# Patient Record
Sex: FEMALE | Race: WHITE | NOT HISPANIC OR LATINO | Employment: UNEMPLOYED | URBAN - METROPOLITAN AREA
[De-identification: names, ages, dates, MRNs, and addresses within clinical notes are randomized per-mention and may not be internally consistent; named-entity substitution may affect disease eponyms.]

---

## 2023-11-06 DIAGNOSIS — Z00.00 ROUTINE ADULT HEALTH MAINTENANCE: ICD-10-CM

## 2023-11-06 DIAGNOSIS — Z76.89 ENCOUNTER TO ESTABLISH CARE: Primary | ICD-10-CM

## 2023-11-07 ENCOUNTER — HOSPITAL ENCOUNTER (OUTPATIENT)
Dept: CARDIOLOGY | Facility: HOSPITAL | Age: 63
Discharge: HOME | End: 2023-11-07
Attending: INTERNAL MEDICINE
Payer: COMMERCIAL

## 2023-11-07 ENCOUNTER — OFFICE VISIT (OUTPATIENT)
Dept: CARDIOLOGY | Facility: CLINIC | Age: 63
End: 2023-11-07
Payer: COMMERCIAL

## 2023-11-07 VITALS
WEIGHT: 229.75 LBS | HEART RATE: 89 BPM | BODY MASS INDEX: 34.82 KG/M2 | OXYGEN SATURATION: 96 % | HEIGHT: 68 IN | SYSTOLIC BLOOD PRESSURE: 145 MMHG | DIASTOLIC BLOOD PRESSURE: 85 MMHG

## 2023-11-07 DIAGNOSIS — Z00.00 ROUTINE ADULT HEALTH MAINTENANCE: ICD-10-CM

## 2023-11-07 DIAGNOSIS — N85.01 ENDOMETRIAL HYPERPLASIA WITHOUT ATYPIA, COMPLEX: ICD-10-CM

## 2023-11-07 DIAGNOSIS — G47.30 SLEEP APNEA, UNSPECIFIED TYPE: ICD-10-CM

## 2023-11-07 DIAGNOSIS — Z01.810 PREOP CARDIOVASCULAR EXAM: ICD-10-CM

## 2023-11-07 DIAGNOSIS — N80.9 ENDOMETRIOSIS: ICD-10-CM

## 2023-11-07 DIAGNOSIS — R00.2 PALPITATIONS: Primary | ICD-10-CM

## 2023-11-07 DIAGNOSIS — N95.0 POSTMENOPAUSAL BLEEDING: ICD-10-CM

## 2023-11-07 DIAGNOSIS — Z76.89 ENCOUNTER TO ESTABLISH CARE: ICD-10-CM

## 2023-11-07 PROCEDURE — 1159F MED LIST DOCD IN RCRD: CPT | Mod: CPTII,S$GLB,, | Performed by: INTERNAL MEDICINE

## 2023-11-07 PROCEDURE — 1159F PR MEDICATION LIST DOCUMENTED IN MEDICAL RECORD: ICD-10-PCS | Mod: CPTII,S$GLB,, | Performed by: INTERNAL MEDICINE

## 2023-11-07 PROCEDURE — 3079F DIAST BP 80-89 MM HG: CPT | Mod: CPTII,S$GLB,, | Performed by: INTERNAL MEDICINE

## 2023-11-07 PROCEDURE — 3008F BODY MASS INDEX DOCD: CPT | Mod: CPTII,S$GLB,, | Performed by: INTERNAL MEDICINE

## 2023-11-07 PROCEDURE — 99999 PR PBB SHADOW E&M-EST. PATIENT-LVL IV: ICD-10-PCS | Mod: PBBFAC,,, | Performed by: INTERNAL MEDICINE

## 2023-11-07 PROCEDURE — 93005 ELECTROCARDIOGRAM TRACING: CPT

## 2023-11-07 PROCEDURE — 3077F PR MOST RECENT SYSTOLIC BLOOD PRESSURE >= 140 MM HG: ICD-10-PCS | Mod: CPTII,S$GLB,, | Performed by: INTERNAL MEDICINE

## 2023-11-07 PROCEDURE — 99204 PR OFFICE/OUTPT VISIT, NEW, LEVL IV, 45-59 MIN: ICD-10-PCS | Mod: S$GLB,,, | Performed by: INTERNAL MEDICINE

## 2023-11-07 PROCEDURE — 3079F PR MOST RECENT DIASTOLIC BLOOD PRESSURE 80-89 MM HG: ICD-10-PCS | Mod: CPTII,S$GLB,, | Performed by: INTERNAL MEDICINE

## 2023-11-07 PROCEDURE — 3077F SYST BP >= 140 MM HG: CPT | Mod: CPTII,S$GLB,, | Performed by: INTERNAL MEDICINE

## 2023-11-07 PROCEDURE — 99999 PR PBB SHADOW E&M-EST. PATIENT-LVL IV: CPT | Mod: PBBFAC,,, | Performed by: INTERNAL MEDICINE

## 2023-11-07 PROCEDURE — 93010 EKG 12-LEAD: ICD-10-PCS | Mod: ,,, | Performed by: INTERNAL MEDICINE

## 2023-11-07 PROCEDURE — 99204 OFFICE O/P NEW MOD 45 MIN: CPT | Mod: S$GLB,,, | Performed by: INTERNAL MEDICINE

## 2023-11-07 PROCEDURE — 3008F PR BODY MASS INDEX (BMI) DOCUMENTED: ICD-10-PCS | Mod: CPTII,S$GLB,, | Performed by: INTERNAL MEDICINE

## 2023-11-07 PROCEDURE — 93010 ELECTROCARDIOGRAM REPORT: CPT | Mod: ,,, | Performed by: INTERNAL MEDICINE

## 2023-11-07 NOTE — PROGRESS NOTES
Subjective:   Patient ID:  Dyan Saini is a 63 y.o. female who presents for cardiac consult of Palpitations (Concerns with palpitations. Pt states palpitations are only present in the middle of the night and causes her to wake up out of her sleep.) and Irregular Heart Beat      Referral by: Alicia Cheney Md  [address]  [address]  [place]  [place] [address]     Reason for consult: palpitations       HPI  The patient came in today for cardiac consult of Palpitations (Concerns with palpitations. Pt states palpitations are only present in the middle of the night and causes her to wake up out of her sleep.) and Irregular Heart Beat    23  Dyan Saini is a 63 y.o. female with obesity, post menopausal bleeding sec to endometriosis, endometrial hyperplasia presents for CV eval of palpitations.     Pt has been having ongoing vaginal bleeding - per OB/GYN - Plan RALH BSO towards the end of the year for recurrent hyperplasia  Per pt - I have been waking up with pounding heart or racing heart rate .Should this be addressed before my surgery? I have horrible varicose veins and I was wondering if having surgery would increase my risks for blood clots .      BP elevated today 150s/90s. HR 80s. BMI 34 - 229 lbs.   She has been having palpitations during the middle of the night.   She has upcoming hysterectomy next week.     ECG - NSR, poor RWP, low voltage     No cardiac monitor results found for the past 12 months         Past Medical History:   Diagnosis Date    Endometrial hyperplasia without atypia, complex 2016    Endometrial polyp 2019    Endometriosis        Past Surgical History:   Procedure Laterality Date     SECTION      COLONOSCOPY      normal    DILATION AND CURETTAGE OF UTERUS  2016    HYSTEROSCOPY WITH DILATION AND CURETTAGE OF UTERUS  2019    polyp    HYSTEROSCOPY WITH DILATION AND CURETTAGE OF UTERUS  07/10/2023    Hyperplasia without atypia within endometrial polyp  "   LAPAROSCOPY  1998    endometrioma    TUBAL LIGATION Bilateral 1998       Social History     Tobacco Use    Smoking status: Never    Smokeless tobacco: Never   Substance Use Topics    Alcohol use: No    Drug use: No       Family History   Problem Relation Age of Onset    Breast cancer Mother         60 and 70    Breast cancer Paternal Grandmother         50's       Patient's Medications   New Prescriptions    No medications on file   Previous Medications    ERGOCALCIFEROL (VITAMIN D2) 50,000 UNIT CAP    Take 50,000 Units by mouth every 7 days.    MEDROXYPROGESTERONE (PROVERA) 2.5 MG TABLET    Take 1 tablet (2.5 mg total) by mouth once daily.   Modified Medications    No medications on file   Discontinued Medications    No medications on file       Review of Systems   Constitutional: Negative.    HENT: Negative.     Eyes: Negative.    Respiratory: Negative.     Cardiovascular:  Positive for palpitations.   Gastrointestinal: Negative.    Genitourinary: Negative.    Musculoskeletal: Negative.    Skin: Negative.    Neurological: Negative.    Endo/Heme/Allergies: Negative.    Psychiatric/Behavioral: Negative.     All 12 systems otherwise negative.      Wt Readings from Last 3 Encounters:   11/07/23 104.2 kg (229 lb 11.5 oz)   07/20/23 103 kg (227 lb)   06/30/23 103.4 kg (228 lb)     Temp Readings from Last 3 Encounters:   07/10/13 97.8 °F (36.6 °C) (Oral)     BP Readings from Last 3 Encounters:   11/07/23 (!) 145/85   07/20/23 (!) 138/90   06/30/23 130/82     Pulse Readings from Last 3 Encounters:   11/07/23 89   07/10/13 96       BP (!) 145/85 (BP Location: Right arm, Patient Position: Sitting, BP Method: Large (Manual))   Pulse 89   Ht 5' 8" (1.727 m)   Wt 104.2 kg (229 lb 11.5 oz)   LMP 07/08/2013   SpO2 96%   BMI 34.93 kg/m²     Objective:   Physical Exam  Vitals and nursing note reviewed.   Constitutional:       General: She is not in acute distress.     Appearance: She is well-developed. She is not " diaphoretic.   HENT:      Head: Normocephalic and atraumatic.      Nose: Nose normal.   Eyes:      General: No scleral icterus.     Conjunctiva/sclera: Conjunctivae normal.   Neck:      Thyroid: No thyromegaly.      Vascular: No JVD.   Cardiovascular:      Rate and Rhythm: Normal rate and regular rhythm.      Heart sounds: S1 normal and S2 normal. No murmur heard.     No friction rub. No gallop. No S3 or S4 sounds.   Pulmonary:      Effort: Pulmonary effort is normal. No respiratory distress.      Breath sounds: Normal breath sounds. No stridor. No wheezing or rales.   Chest:      Chest wall: No tenderness.   Abdominal:      General: Bowel sounds are normal. There is no distension.      Palpations: Abdomen is soft. There is no mass.      Tenderness: There is no abdominal tenderness. There is no rebound.   Genitourinary:     Comments: Deferred  Musculoskeletal:         General: No tenderness or deformity. Normal range of motion.      Cervical back: Normal range of motion and neck supple.   Lymphadenopathy:      Cervical: No cervical adenopathy.   Skin:     General: Skin is warm and dry.      Coloration: Skin is not pale.      Findings: No erythema or rash.   Neurological:      Mental Status: She is alert and oriented to person, place, and time.      Motor: No abnormal muscle tone.      Coordination: Coordination normal.   Psychiatric:         Behavior: Behavior normal.         Thought Content: Thought content normal.         Judgment: Judgment normal.         Lab Results   Component Value Date     07/10/2013    K 3.9 07/10/2013     07/10/2013    CO2 22 (L) 07/10/2013    BUN 13 07/10/2013    CREATININE 0.7 07/10/2013     07/10/2013    MG 2.0 07/10/2013    AST 19 07/10/2013    ALT 22 07/10/2013    ALBUMIN 4.2 07/10/2013    PROT 7.7 07/10/2013    BILITOT 0.6 07/10/2013    WBC 14.43 (H) 07/10/2013    HGB 13.3 07/10/2013    HCT 41.3 07/10/2013    MCV 86.2 07/10/2013     07/10/2013    INR 1.0  07/10/2013         INR (no units)   Date Value   07/10/2013 1.0          Assessment:      1. Palpitations    2. Endometrial hyperplasia without atypia, complex    3. Endometriosis    4. Postmenopausal bleeding    5. Preop cardiovascular exam    6. Sleep apnea, unspecified type        Plan:     Palpitations, elevated BP   - order ECHO and Holter   - R/o GOMEZ     2. Endometrial hyperplasia, endometriosis  - with post menopausal bleeding  - f/u OB/gyn  - has upcoming hysterectomy     3. Obesity. BMI 34   - cont weight loss     4. Pre-OP CV evaluation prior to hysterectomy   Low periop risk of CV events for moderate risk procedure.  Good functional and exercise capacity.  No chest pain, active arrhythmia and CHF symptoms.  Ok to proceed to the scheduled surgery without further cardiac study.    Thank you for allowing me to participate in this patient's care. Please do not hesitate to contact me with any questions or concerns. Consult note has been forwarded to the referral physician.     Perdito Escalona MD, [facility]  Cardiovascular Disease  [facility], [place]  [phone] (P)

## 2023-11-08 ENCOUNTER — HOSPITAL ENCOUNTER (OUTPATIENT)
Dept: CARDIOLOGY | Facility: HOSPITAL | Age: 63
Discharge: HOME | End: 2023-11-08
Attending: INTERNAL MEDICINE
Payer: COMMERCIAL

## 2023-11-08 VITALS
SYSTOLIC BLOOD PRESSURE: 145 MMHG | DIASTOLIC BLOOD PRESSURE: 85 MMHG | BODY MASS INDEX: 34.71 KG/M2 | HEIGHT: 68 IN | WEIGHT: 229 LBS

## 2023-11-08 DIAGNOSIS — N95.0 POSTMENOPAUSAL BLEEDING: ICD-10-CM

## 2023-11-08 DIAGNOSIS — N85.01 ENDOMETRIAL HYPERPLASIA WITHOUT ATYPIA, COMPLEX: ICD-10-CM

## 2023-11-08 DIAGNOSIS — Z01.810 PREOP CARDIOVASCULAR EXAM: ICD-10-CM

## 2023-11-08 DIAGNOSIS — R00.2 PALPITATIONS: ICD-10-CM

## 2023-11-08 DIAGNOSIS — N80.9 ENDOMETRIOSIS: ICD-10-CM

## 2023-11-08 PROCEDURE — 93306 TTE W/DOPPLER COMPLETE: CPT | Mod: 26,,, | Performed by: INTERNAL MEDICINE

## 2023-11-08 PROCEDURE — 93227 HOLTER MONITOR - 48 HOUR (CUPID ONLY): ICD-10-PCS | Mod: ,,, | Performed by: INTERNAL MEDICINE

## 2023-11-08 PROCEDURE — 93306 TTE W/DOPPLER COMPLETE: CPT

## 2023-11-08 PROCEDURE — 93306 ECHO (CUPID ONLY): ICD-10-PCS | Mod: 26,,, | Performed by: INTERNAL MEDICINE

## 2023-11-08 PROCEDURE — 93227 XTRNL ECG REC<48 HR R&I: CPT | Mod: ,,, | Performed by: INTERNAL MEDICINE

## 2023-11-08 PROCEDURE — 93226 XTRNL ECG REC<48 HR SCAN A/R: CPT

## 2023-11-09 ENCOUNTER — PATIENT MESSAGE (OUTPATIENT)
Dept: CARDIOLOGY | Facility: CLINIC | Age: 63
End: 2023-11-09
Payer: COMMERCIAL

## 2023-11-09 LAB
AORTIC ROOT ANNULUS: 3.16 CM
ASCENDING AORTA: 3.37 CM
AV INDEX (PROSTH): 0.91
AV MEAN GRADIENT: 3 MMHG
AV PEAK GRADIENT: 6 MMHG
AV VALVE AREA BY VELOCITY RATIO: 2.46 CM²
AV VALVE AREA: 2.67 CM²
AV VELOCITY RATIO: 0.84
BSA FOR ECHO PROCEDURE: 2.23 M2
CV ECHO LV RWT: 0.52 CM
DOP CALC AO PEAK VEL: 1.25 M/S
DOP CALC AO VTI: 27.5 CM
DOP CALC LVOT AREA: 2.9 CM2
DOP CALC LVOT DIAMETER: 1.93 CM
DOP CALC LVOT PEAK VEL: 1.05 M/S
DOP CALC LVOT STROKE VOLUME: 73.39 CM3
DOP CALC RVOT PEAK VEL: 0.54 M/S
DOP CALC RVOT VTI: 11.6 CM
DOP CALCLVOT PEAK VEL VTI: 25.1 CM
E WAVE DECELERATION TIME: 216.57 MSEC
E/A RATIO: 0.76
E/E' RATIO: 6.21 M/S
ECHO LV POSTERIOR WALL: 1.21 CM (ref 0.6–1.1)
FRACTIONAL SHORTENING: 37 % (ref 28–44)
INTERVENTRICULAR SEPTUM: 1.44 CM (ref 0.6–1.1)
IVC DIAMETER: 1.64 CM
IVRT: 68.51 MSEC
LA MAJOR: 5.81 CM
LA MINOR: 5.6 CM
LA WIDTH: 3.7 CM
LEFT ATRIUM SIZE: 3.84 CM
LEFT ATRIUM VOLUME INDEX MOD: 23.3 ML/M2
LEFT ATRIUM VOLUME INDEX: 31.9 ML/M2
LEFT ATRIUM VOLUME MOD: 50.25 CM3
LEFT ATRIUM VOLUME: 68.87 CM3
LEFT INTERNAL DIMENSION IN SYSTOLE: 2.94 CM (ref 2.1–4)
LEFT VENTRICLE DIASTOLIC VOLUME INDEX: 46.31 ML/M2
LEFT VENTRICLE DIASTOLIC VOLUME: 100.03 ML
LEFT VENTRICLE MASS INDEX: 111 G/M2
LEFT VENTRICLE SYSTOLIC VOLUME INDEX: 15.5 ML/M2
LEFT VENTRICLE SYSTOLIC VOLUME: 33.42 ML
LEFT VENTRICULAR INTERNAL DIMENSION IN DIASTOLE: 4.65 CM (ref 3.5–6)
LEFT VENTRICULAR MASS: 240.61 G
LV LATERAL E/E' RATIO: 4.92 M/S
LV SEPTAL E/E' RATIO: 8.43 M/S
LVOT MG: 2.23 MMHG
LVOT MV: 0.69 CM/S
MV PEAK A VEL: 0.78 M/S
MV PEAK E VEL: 0.59 M/S
MV STENOSIS PRESSURE HALF TIME: 62.81 MS
MV VALVE AREA P 1/2 METHOD: 3.5 CM2
PISA TR MAX VEL: 3.09 M/S
PV MEAN GRADIENT: 1 MMHG
PV MV: 0.79 M/S
PV PEAK GRADIENT: 7 MMHG
PV PEAK VELOCITY: 1.31 M/S
RA MAJOR: 4.85 CM
RA PRESSURE ESTIMATED: 3 MMHG
RA WIDTH: 2.98 CM
RIGHT VENTRICULAR END-DIASTOLIC DIMENSION: 3.03 CM
RV TB RVSP: 6 MMHG
RV TISSUE DOPPLER FREE WALL SYSTOLIC VELOCITY 1 (APICAL 4 CHAMBER VIEW): 14.03 CM/S
SINUS: 2.92 CM
STJ: 2.61 CM
TDI LATERAL: 0.12 M/S
TDI SEPTAL: 0.07 M/S
TDI: 0.1 M/S
TR MAX PG: 38 MMHG
TRICUSPID ANNULAR PLANE SYSTOLIC EXCURSION: 2.17 CM
TV REST PULMONARY ARTERY PRESSURE: 41 MMHG
Z-SCORE OF LEFT VENTRICULAR DIMENSION IN END DIASTOLE: -4.2
Z-SCORE OF LEFT VENTRICULAR DIMENSION IN END SYSTOLE: -3.02

## 2023-11-14 ENCOUNTER — PATIENT MESSAGE (OUTPATIENT)
Dept: PULMONOLOGY | Facility: CLINIC | Age: 63
End: 2023-11-14
Payer: COMMERCIAL

## 2023-11-14 LAB
OHS CV EVENT MONITOR DAY: 0
OHS CV HOLTER LENGTH DECIMAL HOURS: 47.67
OHS CV HOLTER LENGTH HOURS: 47
OHS CV HOLTER LENGTH MINUTES: 40
OHS CV HOLTER SINUS AVERAGE HR: 84
OHS CV HOLTER SINUS MAX HR: 136
OHS CV HOLTER SINUS MIN HR: 58

## 2023-11-20 ENCOUNTER — TELEPHONE (OUTPATIENT)
Dept: SLEEP MEDICINE | Facility: CLINIC | Age: 63
End: 2023-11-20
Payer: COMMERCIAL

## 2023-11-20 ENCOUNTER — OFFICE VISIT (OUTPATIENT)
Dept: PULMONOLOGY | Facility: CLINIC | Age: 63
End: 2023-11-20
Payer: COMMERCIAL

## 2023-11-20 VITALS
HEART RATE: 85 BPM | BODY MASS INDEX: 34.4 KG/M2 | WEIGHT: 227 LBS | DIASTOLIC BLOOD PRESSURE: 83 MMHG | SYSTOLIC BLOOD PRESSURE: 127 MMHG | HEIGHT: 68 IN

## 2023-11-20 DIAGNOSIS — R00.2 PALPITATIONS WITH REGULAR CARDIAC RHYTHM: Primary | ICD-10-CM

## 2023-11-20 DIAGNOSIS — E66.9 CLASS 1 OBESITY WITHOUT SERIOUS COMORBIDITY WITH BODY MASS INDEX (BMI) OF 34.0 TO 34.9 IN ADULT, UNSPECIFIED OBESITY TYPE: ICD-10-CM

## 2023-11-20 DIAGNOSIS — G47.30 SLEEP-DISORDERED BREATHING: ICD-10-CM

## 2023-11-20 PROCEDURE — 1159F MED LIST DOCD IN RCRD: CPT | Mod: CPTII,95,, | Performed by: NURSE PRACTITIONER

## 2023-11-20 PROCEDURE — 1160F RVW MEDS BY RX/DR IN RCRD: CPT | Mod: CPTII,95,, | Performed by: NURSE PRACTITIONER

## 2023-11-20 PROCEDURE — 3079F DIAST BP 80-89 MM HG: CPT | Mod: CPTII,95,, | Performed by: NURSE PRACTITIONER

## 2023-11-20 PROCEDURE — 99203 PR OFFICE/OUTPT VISIT, NEW, LEVL III, 30-44 MIN: ICD-10-PCS | Mod: 95,,, | Performed by: NURSE PRACTITIONER

## 2023-11-20 PROCEDURE — 3074F SYST BP LT 130 MM HG: CPT | Mod: CPTII,95,, | Performed by: NURSE PRACTITIONER

## 2023-11-20 PROCEDURE — 99203 OFFICE O/P NEW LOW 30 MIN: CPT | Mod: 95,,, | Performed by: NURSE PRACTITIONER

## 2023-11-20 PROCEDURE — 3074F PR MOST RECENT SYSTOLIC BLOOD PRESSURE < 130 MM HG: ICD-10-PCS | Mod: CPTII,95,, | Performed by: NURSE PRACTITIONER

## 2023-11-20 PROCEDURE — 3008F BODY MASS INDEX DOCD: CPT | Mod: CPTII,95,, | Performed by: NURSE PRACTITIONER

## 2023-11-20 PROCEDURE — 1159F PR MEDICATION LIST DOCUMENTED IN MEDICAL RECORD: ICD-10-PCS | Mod: CPTII,95,, | Performed by: NURSE PRACTITIONER

## 2023-11-20 PROCEDURE — 1160F PR REVIEW ALL MEDS BY PRESCRIBER/CLIN PHARMACIST DOCUMENTED: ICD-10-PCS | Mod: CPTII,95,, | Performed by: NURSE PRACTITIONER

## 2023-11-20 PROCEDURE — 3008F PR BODY MASS INDEX (BMI) DOCUMENTED: ICD-10-PCS | Mod: CPTII,95,, | Performed by: NURSE PRACTITIONER

## 2023-11-20 PROCEDURE — 3079F PR MOST RECENT DIASTOLIC BLOOD PRESSURE 80-89 MM HG: ICD-10-PCS | Mod: CPTII,95,, | Performed by: NURSE PRACTITIONER

## 2023-11-20 NOTE — TELEPHONE ENCOUNTER
----- Message from Shay Lopez sent at 11/20/2023  1:46 PM CST -----  [facility]

## 2023-11-20 NOTE — PROGRESS NOTES
The patient location is: [place]  The chief complaint leading to consultation is: sleep apnea    Visit type: audiovisual    Face to Face time with patient:   32 minutes of total time spent on the encounter, which includes face to face time and non-face to face time preparing to see the patient (eg, review of tests), Obtaining and/or reviewing separately obtained history, Documenting clinical information in the electronic or other health record, Independently interpreting results (not separately reported) and communicating results to the patient/family/caregiver, or Care coordination (not separately reported).         Each patient to whom he or she provides medical services by telemedicine is:  (1) informed of the relationship between the physician and patient and the respective role of any other health care provider with respect to management of the patient; and (2) notified that he or she may decline to receive medical services by telemedicine and may withdraw from such care at any time.    Notes:     Subjective:      Patient ID: Dyan Saini is a 63 y.o. female.    Patient Active Problem List   Diagnosis    Endometrial hyperplasia without atypia, complex    Endometriosis    Postmenopausal bleeding       she has been referred by Pedrito Escalona MD for evaluation and management for   Chief Complaint   Patient presents with    Sleep Apnea       Chief Complaint: Sleep Apnea    HPI:  She presents telemedicine visit for a sleep evaluation sent by cardiologist related to awakening with palpitations at night.   Patient states she has awakening about 3 am after menopause since age 59.   Patient reports restful sleep.  She denies morning headache.   She denies day time napping  She 60 lbs weight gain in oast 10 years.  Cardiovascular risk factors: obesity, heart palp  Bed time is 0900  Wake time is 0430  Sleep onset is within  15 Minutes.  Sleep maintenance difficulties related to early morning awakening  Wake after sleep  onset occurs one time a night.  Nocturia occurs one time a night,   Sleep aids :  NO  Dry mouth :  NO  Sleep walking:  NO  Sleep talking :  NO  Sleep eating: NO  Vivid Dreams :  NO  Cataplexy :  NO    [place] Sleepiness Scale       2023    12:46 PM   EPWORTH SLEEPINESS SCALE   Sitting and reading 0   Watching TV 0   Sitting, inactive in a public place (e.g. a theatre or a meeting) 0   As a passenger in a car for an hour without a break 0   Lying down to rest in the afternoon when circumstances permit 0   Sitting and talking to someone 0   Sitting quietly after a lunch without alcohol 0   In a car, while stopped for a few minutes in traffic 0   Total score 0     Neck circumference is 15 inches.  Mallampati score 2    STOP - BANG Questionnaire:   1. Snoring : Do you snore loudly ?    NO  2. Tired : Do you often feel tired, fatigued, or sleepy during daytime?  NO  3. Observed: Has anyone observed you stop breathing during your sleep?    NO  4. Blood pressure : Do you have or are you being treated for high blood pressure?   NO  5. BMI :BMI more than 35 kg/m2?   NO  6. Age : Age over 50 yr old?    YES  7. Neck circumference: Neck circumference greater than 40 cm?   NO  8. Gender: Gender male?   NO    SCORE: 1    High risk of GOMEZ: Yes 5 - 8  Intermediate risk of GOMEZ: Yes 3 - 4  Low risk of GOMEZ: Yes 0 - 2    Previous Report Reviewed: lab reports and office notes     Past Medical History: The following portions of the patient's history were reviewed and updated as appropriate:   She  has a past surgical history that includes Colonoscopy (); Hysteroscopy with dilation and curettage of uterus (2019); Dilation and curettage of uterus (2016); Laparoscopy (); Tubal ligation (Bilateral, );  section; Hysteroscopy with dilation and curettage of uterus (07/10/2023); and robotic hysterectomy, with salpingo-oophorectomy (2023).  Her family history includes Breast cancer in her mother and  "paternal grandmother.  She  reports that she has never smoked. She has never used smokeless tobacco. She reports that she does not drink alcohol and does not use drugs.  She has a current medication list which includes the following prescription(s): ergocalciferol.  She is allergic to demerol [meperidine]..    Review of Systems   Constitutional:  Negative for fever, chills, weight loss, weight gain, activity change, appetite change, fatigue and night sweats.   HENT:  Negative for postnasal drip, rhinorrhea, sinus pressure, voice change and congestion.    Eyes:  Negative for redness and itching.   Respiratory:  Positive for snoring. Negative for cough, sputum production, chest tightness, shortness of breath, wheezing, orthopnea, asthma nighttime symptoms, dyspnea on extertion, use of rescue inhaler and somnolence.    Cardiovascular: Negative.  Negative for chest pain, palpitations and leg swelling.   Genitourinary:  Negative for difficulty urinating and hematuria.   Endocrine:  Negative for cold intolerance and heat intolerance.    Musculoskeletal:  Negative for arthralgias, gait problem, joint swelling and myalgias.   Skin: Negative.    Gastrointestinal:  Negative for nausea, vomiting, abdominal pain and acid reflux.   Neurological:  Negative for dizziness, weakness, light-headedness and headaches.   Hematological:  Negative for adenopathy. No excessive bruising.   All other systems reviewed and are negative.     Objective:   /83   Pulse 85   Ht 5' 8" (1.727 m)   Wt 103 kg (227 lb)   LMP 07/08/2013   BMI 34.52 kg/m²   Physical Exam  Vitals and nursing note reviewed.   Constitutional:       General: She is awake.      Appearance: Normal appearance. She is well-developed and well-groomed.   HENT:      Head: Normocephalic.   Eyes:      Conjunctiva/sclera: Conjunctivae normal.   Pulmonary:      Effort: Pulmonary effort is normal.   Neurological:      Mental Status: She is alert.   Psychiatric:         Mood " and Affect: Mood normal.         Behavior: Behavior normal. Behavior is cooperative.         Thought Content: Thought content normal.         Judgment: Judgment normal.         Personal Diagnostic Review  Review of labs, xray's, cardiology reports.     Assessment:     1. Palpitations with regular cardiac rhythm    2. Sleep-disordered breathing    3. Class 1 obesity without serious comorbidity with body mass index (BMI) of 34.0 to 34.9 in adult, unspecified obesity type      Orders Placed This Encounter   Procedures    Home Sleep Study     Standing Status:   Future     Standing Expiration Date:   11/20/2024     Scheduling Instructions:      2 night Home Sleep Study     Plan:   Discussed diagnosis, its evaluation, treatment and usual course. All questions answered.  Problem List Items Addressed This Visit    None  Visit Diagnoses       Palpitations with regular cardiac rhythm    -  Primary    Relevant Orders    Home Sleep Study    Sleep-disordered breathing        low risk jorge, no snoring, no daytime sleepiness. heart palpations w/nsr. 2 nights hsat. pt request home sleep testing    Relevant Orders    Home Sleep Study    Class 1 obesity without serious comorbidity with body mass index (BMI) of 34.0 to 34.9 in adult, unspecified obesity type        Relevant Orders    Home Sleep Study          I spent a total of 32 minutes on the day of the visit.  This includes face to face time and non-face to face time preparing to see the patient (eg, review of tests), obtaining and/or reviewing separately obtained history, documenting clinical information in the electronic or other health record, independently interpreting results and communicating results to the patient/family/caregiver, or care coordinator.    Follow up for if JORGE, begin PAP therapy nasal then fu IDL.    Thank you for the opportunity to participate in the care of this patient.

## 2023-12-11 ENCOUNTER — PATIENT MESSAGE (OUTPATIENT)
Dept: PULMONOLOGY | Facility: CLINIC | Age: 63
End: 2023-12-11

## 2023-12-11 ENCOUNTER — HOSPITAL ENCOUNTER (OUTPATIENT)
Dept: SLEEP MEDICINE | Facility: HOSPITAL | Age: 63
Discharge: HOME | End: 2023-12-11
Attending: NURSE PRACTITIONER
Payer: COMMERCIAL

## 2023-12-11 DIAGNOSIS — R00.2 PALPITATIONS WITH REGULAR CARDIAC RHYTHM: ICD-10-CM

## 2023-12-11 DIAGNOSIS — E66.9 CLASS 1 OBESITY WITHOUT SERIOUS COMORBIDITY WITH BODY MASS INDEX (BMI) OF 34.0 TO 34.9 IN ADULT, UNSPECIFIED OBESITY TYPE: ICD-10-CM

## 2023-12-11 DIAGNOSIS — G47.33 OSA (OBSTRUCTIVE SLEEP APNEA): Primary | ICD-10-CM

## 2023-12-11 DIAGNOSIS — G47.33 OBSTRUCTIVE SLEEP APNEA: Primary | ICD-10-CM

## 2023-12-11 DIAGNOSIS — G47.30 SLEEP-DISORDERED BREATHING: ICD-10-CM

## 2023-12-11 PROCEDURE — 95806 SLEEP STUDY UNATT&RESP EFFT: CPT | Mod: 26,,, | Performed by: INTERNAL MEDICINE

## 2023-12-11 PROCEDURE — 95806 SLEEP STUDY UNATT&RESP EFFT: CPT | Performed by: INTERNAL MEDICINE

## 2023-12-11 PROCEDURE — 95806 PR SLEEP STUDY, UNATTENDED, SIMUL RECORD HR/O2 SAT/RESP FLOW/RESP EFFT: ICD-10-PCS | Mod: 26,,, | Performed by: INTERNAL MEDICINE

## 2023-12-11 NOTE — Clinical Note
2 night study MODERATE OBSTRUCTIVE SLEEP APNEA with overall AHI 21.5/hr (128 events): night #1 Oxygen desaturation: 86%. SpO2 between 85% to 89% for 2 min. Patient snored 99% time above 50 . Heart rate range: 72 bpm - 111bpm REC's: Therapy with APAP at 6-20 cm WP using mask of choice with heated humidification is an option. Please refer to sleep disorders clinic Weight loss/management. with regular exercise per direction of physician. Avoid drowsy driving. Follow up in sleep clinic to maximize adherence and ensure resolution of symptoms

## 2023-12-11 NOTE — ASSESSMENT & PLAN NOTE
11/29/2023, 11/30/2023 Home Sleep Study 2 night study  MODERATE GOMEZ with overall AHI 21.5/hr.   12/11/2023 order Auto CPAP 5-20 cm   Nasal mask   HME: [facility]   Follow up 31-90 days from obtaining PAP therapy for IDL.

## 2023-12-11 NOTE — PROGRESS NOTES
Orders Placed This Encounter   Procedures    CPAP FOR HOME USE     11/29/2023, 11/30/2023 Home Sleep Study 2 night study MODERATE OBSTRUCTIVE SLEEP APNEA with overall AHI 21.5/hr (128 events): night #1. Oxygen desaturation: 86%. SpO2 between 85% to 89% for 2 min.  Patient snored 99% time above 50 .Heart rate range: 72 bpm - 111bpm     Order Specific Question:   Length of need (1-99 months):     Answer:   99     Order Specific Question:   Type ():     Answer:   Auto CPAP     Order Specific Question:   Auto CPAP pressure setting range (cmH20):     Answer:   5-20     Order Specific Question:   Fulfillment Priority:     Answer:   Level 4:  all others     Order Specific Question:   Humidification ():     Answer:   Heated     Order Specific Question:   Choose ONE mask type and its corresponding cushions and/or pillows:     Answer:    Nasal Mask, 1 per 90 days:  Nasal Cushions, (6 per 90 days):  Nasal Pillows, (6 per 90 days)     Order Specific Question:   Choose EITHER Heated or Non-Heated Tubjing     Answer:    Non-Heated Tubing, 1 per 90 days     Order Specific Question:   Number of Days Needed:     Answer:   99     Order Specific Question:   All other supplies as needed as listed below:     Answer:    Headgear, 1 per 180 days     Order Specific Question:   All other supplies as needed as listed below:     Answer:    Chin Strap, 1 per 180 days     Order Specific Question:   All other supplies as needed as listed below:     Answer:    Disposable Filter, 6 per 90 days     Order Specific Question:   All other supplies as needed as listed below:     Answer:    Non-Disposable Filter, 1 per 180 days     Order Specific Question:   All other supplies as needed as listed below:     Answer:    Humidifier Chamber, 1 per 180 days     Problem List Items Addressed This Visit       Obstructive sleep apnea - Primary     11/29/2023, 11/30/2023 Home Sleep Study 2 night  study  MODERATE GOMEZ with overall AHI 21.5/hr.   12/11/2023 order Auto CPAP 5-20 cm   Nasal mask   [facility]: [facility]   Follow up 31-90 days from obtaining PAP therapy for IDL.          Relevant Orders    CPAP FOR HOME USE     Follow up in about 10 weeks (around 2/19/2024) for CPAP compliance download after initial set up.

## 2023-12-11 NOTE — PROCEDURES
"2 night study  MODERATE OBSTRUCTIVE SLEEP APNEA with overall AHI 21.5/hr (128 events):  night #1  Oxygen desaturation: 86%. SpO2 between 85% to 89% for 2 min.  Patient snored 99% time above 50 .  Heart rate range: 72 bpm - 111bpm  REC's:  Therapy with APAP at 6-20 cm WP using mask of choice with heated humidification is an option.  Please refer to sleep disorders clinic  Weight loss/management. with regular exercise per direction of physician.  Avoid drowsy driving.  Follow up in sleep clinic to maximize adherence and ensure resolution of [place]  [address] [facility]  [place] [id number]/Alicia Dale, DO         The sleep study that you ordered is complete.  You have ordered sleep LAB services to perform the sleep study for Dyan Saini .      Please find Sleep Study result in  the "Media tab" of Chart Review menu.        You can look  for the report in the  Media by the document type "Sleep Study Documents". Alphabetizing  "Document type" column helps to find the SLEEP STUDY report  Faster.       As the ordering provider, you are responsible for reviewing the results and implementing a treatment plan with your patient.    If you need a Sleep Medicine provider to explain the sleep study findings and arrange treatment for the patient, please refer patient for consultation to our Sleep Clinic via [facility] with Ambulatory Consult Sleep.     To do that please place an order for an  "Ambulatory Consult Sleep" -  order , it will go to our clinic work queue for our staff  to contact the patient for an appointment.      For any questions, please contact our sleep lab  staff at [phone] to talk to clinical staff          Dustin Morgan MD    "

## 2023-12-28 ENCOUNTER — PATIENT MESSAGE (OUTPATIENT)
Dept: PULMONOLOGY | Facility: CLINIC | Age: 63
End: 2023-12-28
Payer: COMMERCIAL

## 2024-02-07 ENCOUNTER — OFFICE VISIT (OUTPATIENT)
Dept: CARDIOLOGY | Facility: CLINIC | Age: 64
End: 2024-02-07
Payer: COMMERCIAL

## 2024-02-07 VITALS
OXYGEN SATURATION: 100 % | HEART RATE: 98 BPM | DIASTOLIC BLOOD PRESSURE: 85 MMHG | HEIGHT: 68 IN | SYSTOLIC BLOOD PRESSURE: 148 MMHG | BODY MASS INDEX: 35.11 KG/M2 | WEIGHT: 231.69 LBS

## 2024-02-07 DIAGNOSIS — R03.0 ELEVATED BLOOD PRESSURE READING: ICD-10-CM

## 2024-02-07 DIAGNOSIS — N80.9 ENDOMETRIOSIS: ICD-10-CM

## 2024-02-07 DIAGNOSIS — N85.01 ENDOMETRIAL HYPERPLASIA WITHOUT ATYPIA, COMPLEX: ICD-10-CM

## 2024-02-07 DIAGNOSIS — G47.33 OSA (OBSTRUCTIVE SLEEP APNEA): ICD-10-CM

## 2024-02-07 DIAGNOSIS — R00.2 PALPITATIONS: ICD-10-CM

## 2024-02-07 DIAGNOSIS — I10 PRIMARY HYPERTENSION: Primary | ICD-10-CM

## 2024-02-07 PROCEDURE — 1160F RVW MEDS BY RX/DR IN RCRD: CPT | Mod: CPTII,S$GLB,, | Performed by: INTERNAL MEDICINE

## 2024-02-07 PROCEDURE — 3008F BODY MASS INDEX DOCD: CPT | Mod: CPTII,S$GLB,, | Performed by: INTERNAL MEDICINE

## 2024-02-07 PROCEDURE — 3077F SYST BP >= 140 MM HG: CPT | Mod: CPTII,S$GLB,, | Performed by: INTERNAL MEDICINE

## 2024-02-07 PROCEDURE — 3079F DIAST BP 80-89 MM HG: CPT | Mod: CPTII,S$GLB,, | Performed by: INTERNAL MEDICINE

## 2024-02-07 PROCEDURE — 99999 PR PBB SHADOW E&M-EST. PATIENT-LVL III: CPT | Mod: PBBFAC,,, | Performed by: INTERNAL MEDICINE

## 2024-02-07 PROCEDURE — 99214 OFFICE O/P EST MOD 30 MIN: CPT | Mod: S$GLB,,, | Performed by: INTERNAL MEDICINE

## 2024-02-07 PROCEDURE — 1159F MED LIST DOCD IN RCRD: CPT | Mod: CPTII,S$GLB,, | Performed by: INTERNAL MEDICINE

## 2024-02-07 RX ORDER — CARVEDILOL 6.25 MG/1
6.25 TABLET ORAL 2 TIMES DAILY WITH MEALS
Qty: 60 TABLET | Refills: 3 | Status: SHIPPED | OUTPATIENT
Start: 2024-02-07 | End: 2024-03-19 | Stop reason: SDUPTHER

## 2024-02-07 NOTE — PROGRESS NOTES
Subjective:   Patient ID:  Dyan Saini is a 63 y.o. female who presents for cardiac consult of Follow-up      Referral by: No referring provider defined for this encounter.     Reason for consult: palpitations       HPI  The patient came in today for cardiac consult of Follow-up      Dyan Saini is a 63 y.o. female with HTN, obesity, GOMEZ, post menopausal bleeding sec to endometriosis, endometrial hyperplasia s/p hysterectomy presents for follow up CV eval.     11/7/23  Pt has been having ongoing vaginal bleeding - per OB/GYN - Plan RALH BSO towards the end of the year for recurrent hyperplasia  Per pt - I have been waking up with pounding heart or racing heart rate .Should this be addressed before my surgery? I have horrible varicose veins and I was wondering if having surgery would increase my risks for blood clots .      BP elevated today 150s/90s. HR 80s. BMI 34 - 229 lbs.   She has been having palpitations during the middle of the night.   She has upcoming hysterectomy next week.   ECG - NSR, poor RWP, low voltage     2/7/24  BP elevated 150/90. HR 90s.   BMI 35 - 231 lbs.   ECHO 11/2023 with normal bi V function, PASP 41 mmHg.   Holter neg 11/2023    She still has trouble sleeping at night, has palpitations at night.     Procedure Note    2 night study  MODERATE OBSTRUCTIVE SLEEP APNEA with overall AHI 21.5/hr (128 events):  night #1  Oxygen desaturation: 86%. SpO2 between 85% to 89% for 2 min.  Patient snored 99% time above 50 .  Heart rate range: 72 bpm - 111bpm  REC's:  Therapy with APAP at 6-20 cm WP using mask of choice with heated humidification is an option.       Holter monitor - 48 hour    Result Date: 11/14/2023    The predominant rhythm is sinus.        Results for orders placed during the hospital encounter of 11/08/23    Echo    Interpretation Summary    Left Ventricle: The left ventricle is normal in size. Normal wall thickness. There is concentric hypertrophy. Normal wall motion. There  is normal systolic function with a visually estimated ejection fraction of 60 - 65%. There is normal diastolic function.    Right Ventricle: Normal right ventricular cavity size. Wall thickness is normal. Right ventricle wall motion  is normal. Systolic function is normal.    Tricuspid Valve: There is mild regurgitation.    Pulmonary Artery: The estimated pulmonary artery systolic pressure is 41 mmHg.    IVC/SVC: Normal venous pressure at 3 mmHg.        Past Medical History:   Diagnosis Date    Endometrial hyperplasia without atypia, complex 2016    Endometrial polyp 2019    Endometriosis        Past Surgical History:   Procedure Laterality Date     SECTION      COLONOSCOPY      normal    DILATION AND CURETTAGE OF UTERUS  2016    HYSTEROSCOPY WITH DILATION AND CURETTAGE OF UTERUS  2019    polyp    HYSTEROSCOPY WITH DILATION AND CURETTAGE OF UTERUS  07/10/2023    Hyperplasia without atypia within endometrial polyp    LAPAROSCOPY      endometrioma    ROBOTIC HYSTERECTOMY, WITH SALPINGO-OOPHORECTOMY  2023    Endometrial hyperplasia    TUBAL LIGATION Bilateral        Social History     Tobacco Use    Smoking status: Never    Smokeless tobacco: Never   Substance Use Topics    Alcohol use: No    Drug use: No       Family History   Problem Relation Age of Onset    Breast cancer Mother         60 and 70    Breast cancer Paternal Grandmother         50's       Patient's Medications   New Prescriptions    CARVEDILOL (COREG) 6.25 MG TABLET    Take 1 tablet (6.25 mg total) by mouth 2 (two) times daily with meals.   Previous Medications    No medications on file   Modified Medications    No medications on file   Discontinued Medications    No medications on file       Review of Systems   Constitutional: Negative.    HENT: Negative.     Eyes: Negative.    Respiratory: Negative.     Cardiovascular:  Positive for palpitations.   Gastrointestinal: Negative.    Genitourinary: Negative.   "  Musculoskeletal: Negative.    Skin: Negative.    Neurological: Negative.    Endo/Heme/Allergies: Negative.    Psychiatric/Behavioral: Negative.     All 12 systems otherwise negative.      Wt Readings from Last 3 Encounters:   02/07/24 105.1 kg (231 lb 11.3 oz)   12/29/23 102.5 kg (226 lb)   12/08/23 101.6 kg (224 lb)     Temp Readings from Last 3 Encounters:   11/08/23 98.8 °F (37.1 °C)   07/10/13 97.8 °F (36.6 °C) (Oral)     BP Readings from Last 3 Encounters:   02/07/24 (!) 148/85   12/29/23 130/82   12/08/23 (!) 140/80     Pulse Readings from Last 3 Encounters:   02/07/24 98   11/20/23 85   11/08/23 (!) 114       BP (!) 148/85 (BP Location: Right arm, Patient Position: Sitting, BP Method: Large (Manual))   Pulse 98   Ht 5' 8" (1.727 m)   Wt 105.1 kg (231 lb 11.3 oz)   LMP 07/08/2013   SpO2 100%   BMI 35.23 kg/m²     Objective:   Physical Exam  Vitals and nursing note reviewed.   Constitutional:       General: She is not in acute distress.     Appearance: She is well-developed. She is not diaphoretic.   HENT:      Head: Normocephalic and atraumatic.      Nose: Nose normal.   Eyes:      General: No scleral icterus.     Conjunctiva/sclera: Conjunctivae normal.   Neck:      Thyroid: No thyromegaly.      Vascular: No JVD.   Cardiovascular:      Rate and Rhythm: Normal rate and regular rhythm.      Heart sounds: S1 normal and S2 normal. No murmur heard.     No friction rub. No gallop. No S3 or S4 sounds.   Pulmonary:      Effort: Pulmonary effort is normal. No respiratory distress.      Breath sounds: Normal breath sounds. No stridor. No wheezing or rales.   Chest:      Chest wall: No tenderness.   Abdominal:      General: Bowel sounds are normal. There is no distension.      Palpations: Abdomen is soft. There is no mass.      Tenderness: There is no abdominal tenderness. There is no rebound.   Genitourinary:     Comments: Deferred  Musculoskeletal:         General: No tenderness or deformity. Normal range of " motion.      Cervical back: Normal range of motion and neck supple.   Lymphadenopathy:      Cervical: No cervical adenopathy.   Skin:     General: Skin is warm and dry.      Coloration: Skin is not pale.      Findings: No erythema or rash.   Neurological:      Mental Status: She is alert and oriented to person, place, and time.      Motor: No abnormal muscle tone.      Coordination: Coordination normal.   Psychiatric:         Behavior: Behavior normal.         Thought Content: Thought content normal.         Judgment: Judgment normal.         Lab Results   Component Value Date     07/10/2013    K 3.9 07/10/2013     07/10/2013    CO2 22 (L) 07/10/2013    BUN 13 07/10/2013    CREATININE 0.7 07/10/2013     07/10/2013    MG 2.0 07/10/2013    AST 19 07/10/2013    ALT 22 07/10/2013    ALBUMIN 4.2 07/10/2013    PROT 7.7 07/10/2013    BILITOT 0.6 07/10/2013    WBC 14.43 (H) 07/10/2013    HGB 13.3 07/10/2013    HCT 41.3 07/10/2013    MCV 86.2 07/10/2013     07/10/2013    INR 1.0 07/10/2013         INR (no units)   Date Value   07/10/2013 1.0          Assessment:      1. Primary hypertension    2. Palpitations    3. Endometrial hyperplasia without atypia, complex    4. Endometriosis    5. GOMEZ (obstructive sleep apnea)    6. Elevated blood pressure reading          Plan:     Palpitations, elevated BP   - ECHO 11/2023 with normal bi V function, PASP 41 mmHg.   - Holter neg 11/2023  - start Coreg 6.25 mg BID    2. Endometrial hyperplasia, endometriosis s/p hysterectomy   - with post menopausal bleeding  - f/u OB/gyn    3. Obesity. BMI 34  --> BMI 35 -  231 lbs  - cont weight loss     4. GOMEZ  - cont CPAP    Thank you for allowing me to participate in this patient's care. Please do not hesitate to contact me with any questions or concerns. Consult note has been forwarded to the referral physician.     Pedrito Escalona MD, [facility]  Cardiovascular Disease  [facility], [place]  [phone] (P)

## 2024-03-14 ENCOUNTER — PATIENT MESSAGE (OUTPATIENT)
Dept: PULMONOLOGY | Facility: CLINIC | Age: 64
End: 2024-03-14
Payer: COMMERCIAL

## 2024-03-19 ENCOUNTER — PATIENT MESSAGE (OUTPATIENT)
Dept: CARDIOLOGY | Facility: CLINIC | Age: 64
End: 2024-03-19
Payer: COMMERCIAL

## 2024-03-19 ENCOUNTER — OFFICE VISIT (OUTPATIENT)
Dept: PULMONOLOGY | Facility: CLINIC | Age: 64
End: 2024-03-19
Payer: COMMERCIAL

## 2024-03-19 VITALS
SYSTOLIC BLOOD PRESSURE: 138 MMHG | HEART RATE: 92 BPM | HEIGHT: 68 IN | WEIGHT: 227 LBS | BODY MASS INDEX: 34.4 KG/M2 | DIASTOLIC BLOOD PRESSURE: 89 MMHG

## 2024-03-19 DIAGNOSIS — R00.2 PALPITATIONS WITH REGULAR CARDIAC RHYTHM: Primary | ICD-10-CM

## 2024-03-19 DIAGNOSIS — G47.33 OSA ON CPAP: ICD-10-CM

## 2024-03-19 PROCEDURE — 1159F MED LIST DOCD IN RCRD: CPT | Mod: CPTII,95,, | Performed by: NURSE PRACTITIONER

## 2024-03-19 PROCEDURE — 1160F RVW MEDS BY RX/DR IN RCRD: CPT | Mod: CPTII,95,, | Performed by: NURSE PRACTITIONER

## 2024-03-19 PROCEDURE — 3079F DIAST BP 80-89 MM HG: CPT | Mod: CPTII,95,, | Performed by: NURSE PRACTITIONER

## 2024-03-19 PROCEDURE — 99213 OFFICE O/P EST LOW 20 MIN: CPT | Mod: 95,,, | Performed by: NURSE PRACTITIONER

## 2024-03-19 PROCEDURE — 3075F SYST BP GE 130 - 139MM HG: CPT | Mod: CPTII,95,, | Performed by: NURSE PRACTITIONER

## 2024-03-19 PROCEDURE — 3008F BODY MASS INDEX DOCD: CPT | Mod: CPTII,95,, | Performed by: NURSE PRACTITIONER

## 2024-03-19 RX ORDER — CARVEDILOL 12.5 MG/1
12.5 TABLET ORAL 2 TIMES DAILY WITH MEALS
Qty: 60 TABLET | Refills: 2 | Status: SHIPPED | OUTPATIENT
Start: 2024-03-19 | End: 2024-05-21 | Stop reason: SDUPTHER

## 2024-03-19 NOTE — ASSESSMENT & PLAN NOTE
11/29/2023, 11/30/2023 Home Sleep Study 2 night study  MODERATE GOMEZ with overall AHI 21.5/hr.   12/11/2023 order Auto CPAP 5-20 cm   01/24/2024 Setup [facility]  On Auto CPAP 4-20 cm with optimal control AHI 0.7  Nasal wisp mask   HME: [facility]   Follow up 6 mo, patient requested telemed visit.

## 2024-03-19 NOTE — PROGRESS NOTES
The patient location is: home  The chief complaint leading to consultation is: Sleep apnea    Visit type: audiovisual    Face to Face time with patient:   21 minutes of total time spent on the encounter, which includes face to face time and non-face to face time preparing to see the patient (eg, review of tests), Obtaining and/or reviewing separately obtained history, Documenting clinical information in the electronic or other health record, Independently interpreting results (not separately reported) and communicating results to the patient/family/caregiver, or Care coordination (not separately reported).     Each patient to whom he or she provides medical services by telemedicine is:  (1) informed of the relationship between the physician and patient and the respective role of any other health care provider with respect to management of the patient; and (2) notified that he or she may decline to receive medical services by telemedicine and may withdraw from such care at any time.    Subjective:      Patient ID: Dyan Saini is a 63 y.o. female.    Chief Complaint: Sleep Apnea    HPI: Dyan Saini engaged in telemedicine visit follow up for GOMEZ with initial CPAP complaince assessment.  She is on Auto CPAP of 4-20 cmH2O pressure which is optimally controlling sleep apnea with apneic index (AHI) 0.7 events an hour.   She is compliant with CPAP use. Complaince download today reveals 87% of days with greater than 4 hours of device use.   Patient reports benefit from CPAP use and denies snoring or excessive daytime sleepiness.  Patient reports no complaints. Nasal wisp mask is tolerated.     01/24/2024 Setup [facility]    11/29/2023, 11/30/2023 Home Sleep Study 2 night studyMODERATE GOMEZ  AHI 21.5/hr.         Ewing Sleepiness Scale       3/18/2024    10:31 AM 11/20/2023    12:46 PM   EPWORTH SLEEPINESS SCALE   Sitting and reading 0 0   Watching TV 0 0   Sitting, inactive in a public place (e.g. a theatre or a  meeting) 0 0   As a passenger in a car for an hour without a break 0 0   Lying down to rest in the afternoon when circumstances permit 1 0   Sitting and talking to someone 0 0   Sitting quietly after a lunch without alcohol 0 0   In a car, while stopped for a few minutes in traffic 0 0   Total score 1    1 0       CPAP compliance    Previous Report Reviewed: lab reports and office notes     Past Medical History: The following portions of the patient's history were reviewed and updated as appropriate:   She  has a past surgical history that includes Colonoscopy (); Hysteroscopy with dilation and curettage of uterus (2019); Dilation and curettage of uterus (2016); Laparoscopy (); Tubal ligation (Bilateral, );  section; Hysteroscopy with dilation and curettage of uterus (07/10/2023); and robotic hysterectomy, with salpingo-oophorectomy (2023).  Her family history includes Breast cancer in her mother and paternal grandmother.  She  reports that she has never smoked. She has never used smokeless tobacco. She reports that she does not drink alcohol and does not use drugs.  She has a current medication list which includes the following prescription(s): carvedilol.  She is allergic to demerol [meperidine]..    The following portions of the patient's history were reviewed and updated as appropriate: allergies, current medications, past family history, past medical history, past social history, past surgical history and problem list.    Review of Systems   Constitutional:  Negative for fever, chills, weight loss, weight gain, activity change, appetite change, fatigue and night sweats.   HENT:  Negative for postnasal drip, rhinorrhea, sinus pressure, voice change and congestion.    Eyes:  Negative for redness and itching.   Respiratory:  Negative for snoring, cough, sputum production, chest tightness, shortness of breath, wheezing, orthopnea, asthma nighttime symptoms, dyspnea on extertion,  "use of rescue inhaler and somnolence.    Cardiovascular: Negative.  Negative for chest pain, palpitations and leg swelling.   Genitourinary:  Negative for difficulty urinating and hematuria.   Endocrine:  Negative for cold intolerance and heat intolerance.    Musculoskeletal:  Negative for arthralgias, gait problem, joint swelling and myalgias.   Skin: Negative.    Gastrointestinal:  Negative for nausea, vomiting, abdominal pain and acid reflux.   Neurological:  Negative for dizziness, weakness, light-headedness and headaches.   Hematological:  Negative for adenopathy. No excessive bruising.   All other systems reviewed and are negative.     Objective:   /89 (BP Location: Left arm, Patient Position: Sitting)   Pulse 92   Ht 5' 8" (1.727 m)   Wt 103 kg (227 lb)   LMP 07/08/2013   BMI 34.52 kg/m²   Physical Exam  Vitals and nursing note reviewed.   Constitutional:       General: She is awake.      Appearance: Normal appearance. She is well-developed and well-groomed.   HENT:      Head: Normocephalic.   Eyes:      Conjunctiva/sclera: Conjunctivae normal.   Pulmonary:      Effort: Pulmonary effort is normal.   Neurological:      Mental Status: She is alert.   Psychiatric:         Mood and Affect: Mood normal.         Behavior: Behavior normal. Behavior is cooperative.         Thought Content: Thought content normal.         Judgment: Judgment normal.       Personal Diagnostic Review  CPAP download    Assessment:     1. Palpitations with regular cardiac rhythm    2. GOMEZ on CPAP        No orders of the defined types were placed in this encounter.    Plan:     Problem List Items Addressed This Visit       Palpitations with regular cardiac rhythm - Primary     Continued adherence to CPAP.  Continue follow-up with Cardiology         GOMEZ on CPAP     11/29/2023, 11/30/2023 Home Sleep Study 2 night study  MODERATE GOMEZ with overall AHI 21.5/hr.   12/11/2023 order Auto CPAP 5-20 cm   01/24/2024 Setup [facility]  On " Auto CPAP 4-20 cm with optimal control AHI 0.7  Nasal wisp mask   HME: [facility]   Follow up 6 mo, patient requested telemed visit.               (DME) - [facility]  Reviewed therapeutic goals for positive airway pressure therapy Auto CPAP  Ideal is usage 100% of nights for 6 - 8 hours per night. Minimum usage is 70% of night for at least 4 hours per night used.     I spent a total of 21 minutes on the day of the visit.  This includes face to face time and non-face to face time preparing to see the patient (eg, review of tests), obtaining and/or reviewing separately obtained history, documenting clinical information in the electronic or other health record, independently interpreting results and communicating results to the patient/family/caregiver, or care coordinator.    Follow up in about 6 months (around 9/19/2024) for CPAP 6 mo compliance download telemedicine visit .

## 2024-04-03 ENCOUNTER — PATIENT MESSAGE (OUTPATIENT)
Dept: PULMONOLOGY | Facility: CLINIC | Age: 64
End: 2024-04-03
Payer: COMMERCIAL

## 2024-05-09 DIAGNOSIS — R00.2 PALPITATIONS: Primary | ICD-10-CM

## 2024-05-09 DIAGNOSIS — I10 PRIMARY HYPERTENSION: ICD-10-CM

## 2024-05-21 ENCOUNTER — PATIENT MESSAGE (OUTPATIENT)
Dept: CARDIOLOGY | Facility: CLINIC | Age: 64
End: 2024-05-21

## 2024-05-21 ENCOUNTER — OFFICE VISIT (OUTPATIENT)
Dept: CARDIOLOGY | Facility: CLINIC | Age: 64
End: 2024-05-21
Payer: COMMERCIAL

## 2024-05-21 ENCOUNTER — HOSPITAL ENCOUNTER (OUTPATIENT)
Dept: CARDIOLOGY | Facility: HOSPITAL | Age: 64
Discharge: HOME | End: 2024-05-21
Attending: INTERNAL MEDICINE
Payer: COMMERCIAL

## 2024-05-21 VITALS
OXYGEN SATURATION: 97 % | WEIGHT: 237 LBS | HEIGHT: 68 IN | SYSTOLIC BLOOD PRESSURE: 135 MMHG | HEART RATE: 77 BPM | BODY MASS INDEX: 35.92 KG/M2 | DIASTOLIC BLOOD PRESSURE: 82 MMHG

## 2024-05-21 DIAGNOSIS — G47.33 OSA ON CPAP: ICD-10-CM

## 2024-05-21 DIAGNOSIS — I10 PRIMARY HYPERTENSION: ICD-10-CM

## 2024-05-21 DIAGNOSIS — N85.01 ENDOMETRIAL HYPERPLASIA WITHOUT ATYPIA, COMPLEX: ICD-10-CM

## 2024-05-21 DIAGNOSIS — G47.33 OSA (OBSTRUCTIVE SLEEP APNEA): ICD-10-CM

## 2024-05-21 DIAGNOSIS — R00.2 PALPITATIONS: ICD-10-CM

## 2024-05-21 DIAGNOSIS — N80.9 ENDOMETRIOSIS: ICD-10-CM

## 2024-05-21 DIAGNOSIS — R00.2 PALPITATIONS: Primary | ICD-10-CM

## 2024-05-21 DIAGNOSIS — R03.0 ELEVATED BLOOD PRESSURE READING: ICD-10-CM

## 2024-05-21 LAB
OHS QRS DURATION: 82 MS
OHS QTC CALCULATION: 424 MS

## 2024-05-21 PROCEDURE — 1160F RVW MEDS BY RX/DR IN RCRD: CPT | Mod: CPTII,S$GLB,, | Performed by: INTERNAL MEDICINE

## 2024-05-21 PROCEDURE — G2211 COMPLEX E/M VISIT ADD ON: HCPCS | Mod: S$GLB,,, | Performed by: INTERNAL MEDICINE

## 2024-05-21 PROCEDURE — 99999 PR PBB SHADOW E&M-EST. PATIENT-LVL III: CPT | Mod: PBBFAC,,, | Performed by: INTERNAL MEDICINE

## 2024-05-21 PROCEDURE — 99214 OFFICE O/P EST MOD 30 MIN: CPT | Mod: S$GLB,,, | Performed by: INTERNAL MEDICINE

## 2024-05-21 PROCEDURE — 93010 ELECTROCARDIOGRAM REPORT: CPT | Mod: ,,, | Performed by: INTERNAL MEDICINE

## 2024-05-21 PROCEDURE — 93005 ELECTROCARDIOGRAM TRACING: CPT

## 2024-05-21 PROCEDURE — 1159F MED LIST DOCD IN RCRD: CPT | Mod: CPTII,S$GLB,, | Performed by: INTERNAL MEDICINE

## 2024-05-21 PROCEDURE — 3008F BODY MASS INDEX DOCD: CPT | Mod: CPTII,S$GLB,, | Performed by: INTERNAL MEDICINE

## 2024-05-21 PROCEDURE — 3075F SYST BP GE 130 - 139MM HG: CPT | Mod: CPTII,S$GLB,, | Performed by: INTERNAL MEDICINE

## 2024-05-21 PROCEDURE — 3079F DIAST BP 80-89 MM HG: CPT | Mod: CPTII,S$GLB,, | Performed by: INTERNAL MEDICINE

## 2024-05-21 RX ORDER — PHENAZOPYRIDINE HYDROCHLORIDE 200 MG/1
200 TABLET, FILM COATED ORAL 3 TIMES DAILY PRN
COMMUNITY
Start: 2024-05-20 | End: 2024-05-23

## 2024-05-21 RX ORDER — NITROFURANTOIN 25; 75 MG/1; MG/1
100 CAPSULE ORAL
COMMUNITY
Start: 2024-05-20 | End: 2024-05-25

## 2024-05-21 RX ORDER — CARVEDILOL 12.5 MG/1
12.5 TABLET ORAL 2 TIMES DAILY WITH MEALS
Qty: 180 TABLET | Refills: 1 | Status: SHIPPED | OUTPATIENT
Start: 2024-05-21 | End: 2025-05-21

## 2024-05-21 NOTE — PROGRESS NOTES
Subjective:   Patient ID:  Dyan Saini is a 63 y.o. female who presents for cardiac consult of No chief complaint on file.      Referral by: No referring provider defined for this encounter.     Reason for consult: palpitations       HPI  The patient came in today for cardiac consult of No chief complaint on file.      Dyan Saini is a 63 y.o. female with HTN, obesity, GOMEZ, post menopausal bleeding sec to endometriosis, endometrial hyperplasia s/p hysterectomy presents for follow up CV eval.       2/7/24  BP elevated 150/90. HR 90s.   BMI 35 - 231 lbs.   ECHO 11/2023 with normal bi V function, PASP 41 mmHg.   Holter neg 11/2023  She still has trouble sleeping at night, has palpitations at night.       5/21/24  From pt - I just had a virtual visit with Pulmonologist Nurse Practitioner.I wanted to let you know my blood pressure is 138/89 My Sleep apnea is being controlled by the machine very well.Are you ok with my Blood Pressure!     I increase Coreg to 12.5 mg BID  BP and HR stable today. BMI 36 - 236 lbs   She is able to tolerate CPAP for the most part.       Procedure Note    2 night study  MODERATE OBSTRUCTIVE SLEEP APNEA with overall AHI 21.5/hr (128 events):  night #1  Oxygen desaturation: 86%. SpO2 between 85% to 89% for 2 min.  Patient snored 99% time above 50 .  Heart rate range: 72 bpm - 111bpm  REC's:  Therapy with APAP at 6-20 cm WP using mask of choice with heated humidification is an option.       Holter monitor - 48 hour    Result Date: 11/14/2023    The predominant rhythm is sinus.        Results for orders placed during the hospital encounter of 11/08/23    Echo    Interpretation Summary    Left Ventricle: The left ventricle is normal in size. Normal wall thickness. There is concentric hypertrophy. Normal wall motion. There is normal systolic function with a visually estimated ejection fraction of 60 - 65%. There is normal diastolic function.    Right Ventricle: Normal right ventricular  cavity size. Wall thickness is normal. Right ventricle wall motion  is normal. Systolic function is normal.    Tricuspid Valve: There is mild regurgitation.    Pulmonary Artery: The estimated pulmonary artery systolic pressure is 41 mmHg.    IVC/SVC: Normal venous pressure at 3 mmHg.        Past Medical History:   Diagnosis Date    Endometrial hyperplasia without atypia, complex 2016    Endometrial polyp 2019    Endometriosis        Past Surgical History:   Procedure Laterality Date     SECTION      COLONOSCOPY      normal    DILATION AND CURETTAGE OF UTERUS  2016    HYSTEROSCOPY WITH DILATION AND CURETTAGE OF UTERUS  2019    polyp    HYSTEROSCOPY WITH DILATION AND CURETTAGE OF UTERUS  07/10/2023    Hyperplasia without atypia within endometrial polyp    LAPAROSCOPY      endometrioma    ROBOTIC HYSTERECTOMY, WITH SALPINGO-OOPHORECTOMY  2023    Endometrial hyperplasia    TUBAL LIGATION Bilateral        Social History     Tobacco Use    Smoking status: Never    Smokeless tobacco: Never   Substance Use Topics    Alcohol use: No    Drug use: No       Family History   Problem Relation Name Age of Onset    Breast cancer Mother          60 and 70    Breast cancer Paternal Grandmother          50's       Patient's Medications   New Prescriptions    No medications on file   Previous Medications    CARVEDILOL (COREG) 12.5 MG TABLET    Take 1 tablet (12.5 mg total) by mouth 2 (two) times daily with meals.    NITROFURANTOIN, MACROCRYSTAL-MONOHYDRATE, (MACROBID) 100 MG CAPSULE    Take 100 mg by mouth.    PHENAZOPYRIDINE (PYRIDIUM) 200 MG TABLET    Take 200 mg by mouth 3 (three) times daily as needed.   Modified Medications    No medications on file   Discontinued Medications    No medications on file       Review of Systems   Constitutional: Negative.    HENT: Negative.     Eyes: Negative.    Respiratory: Negative.     Cardiovascular:  Positive for palpitations.   Gastrointestinal:  "Negative.    Genitourinary: Negative.    Musculoskeletal: Negative.    Skin: Negative.    Neurological: Negative.    Endo/Heme/Allergies: Negative.    Psychiatric/Behavioral: Negative.     All 12 systems otherwise negative.      Wt Readings from Last 3 Encounters:   05/21/24 107.5 kg (236 lb 15.9 oz)   03/19/24 103 kg (227 lb)   02/07/24 105.1 kg (231 lb 11.3 oz)     Temp Readings from Last 3 Encounters:   11/08/23 98.8 °F (37.1 °C)   07/10/13 97.8 °F (36.6 °C) (Oral)     BP Readings from Last 3 Encounters:   05/21/24 135/82   03/19/24 138/89   02/07/24 (!) 148/85     Pulse Readings from Last 3 Encounters:   05/21/24 77   03/19/24 92   02/07/24 98       /82 (BP Location: Right arm, Patient Position: Sitting, BP Method: Medium (Automatic))   Pulse 77   Ht 5' 8" (1.727 m)   Wt 107.5 kg (236 lb 15.9 oz)   LMP 07/08/2013   SpO2 97%   BMI 36.03 kg/m²     Objective:   Physical Exam  Vitals and nursing note reviewed.   Constitutional:       General: She is not in acute distress.     Appearance: She is well-developed. She is not diaphoretic.   HENT:      Head: Normocephalic and atraumatic.      Nose: Nose normal.   Eyes:      General: No scleral icterus.     Conjunctiva/sclera: Conjunctivae normal.   Neck:      Thyroid: No thyromegaly.      Vascular: No JVD.   Cardiovascular:      Rate and Rhythm: Normal rate and regular rhythm.      Heart sounds: S1 normal and S2 normal. No murmur heard.     No friction rub. No gallop. No S3 or S4 sounds.   Pulmonary:      Effort: Pulmonary effort is normal. No respiratory distress.      Breath sounds: Normal breath sounds. No stridor. No wheezing or rales.   Chest:      Chest wall: No tenderness.   Abdominal:      General: Bowel sounds are normal. There is no distension.      Palpations: Abdomen is soft. There is no mass.      Tenderness: There is no abdominal tenderness. There is no rebound.   Genitourinary:     Comments: Deferred  Musculoskeletal:         General: No " tenderness or deformity. Normal range of motion.      Cervical back: Normal range of motion and neck supple.   Lymphadenopathy:      Cervical: No cervical adenopathy.   Skin:     General: Skin is warm and dry.      Coloration: Skin is not pale.      Findings: No erythema or rash.   Neurological:      Mental Status: She is alert and oriented to person, place, and time.      Motor: No abnormal muscle tone.      Coordination: Coordination normal.   Psychiatric:         Behavior: Behavior normal.         Thought Content: Thought content normal.         Judgment: Judgment normal.         Lab Results   Component Value Date     07/10/2013    K 3.9 07/10/2013     07/10/2013    CO2 22 (L) 07/10/2013    BUN 13 07/10/2013    CREATININE 0.7 07/10/2013     07/10/2013    MG 2.0 07/10/2013    AST 19 07/10/2013    ALT 22 07/10/2013    ALBUMIN 4.2 07/10/2013    PROT 7.7 07/10/2013    BILITOT 0.6 07/10/2013    WBC 14.43 (H) 07/10/2013    HGB 13.3 07/10/2013    HCT 41.3 07/10/2013    MCV 86.2 07/10/2013     07/10/2013    INR 1.0 07/10/2013         INR (no units)   Date Value   07/10/2013 1.0          Assessment:      1. Palpitations    2. Primary hypertension    3. GOMEZ (obstructive sleep apnea)    4. Elevated blood pressure reading    5. Endometrial hyperplasia without atypia, complex    6. Endometriosis    7. GOMEZ on CPAP    8. BMI 35.0-35.9,adult            Plan:     HTN with Palpitations - improved  - ECHO 11/2023 with normal bi V function, PASP 41 mmHg.   - Holter neg 11/2023  - start Coreg 6.25 mg BID -- increased to 12.5 mg     2. Endometrial hyperplasia, endometriosis s/p hysterectomy   - with post menopausal bleeding  - f/u OB/gyn    3. Obesity. BMI 34  --> BMI 35 -  231 lbs  --> BMI 36 - 236 lbs   - cont weight loss     4. GOMEZ  - cont CPAP    Visit today included increased complexity associated with the care of the episodic problem palpitations addressed and managing the longitudinal care of the  patient due to the serious and/or complex managed problem(s) .      Thank you for allowing me to participate in this patient's care. Please do not hesitate to contact me with any questions or concerns. Consult note has been forwarded to the referral physician.     Pedrito Escalona MD, [facility]  Cardiovascular Disease  [facility], [place]  [phone] (P)

## 2024-08-29 RX ORDER — CARVEDILOL 12.5 MG/1
12.5 TABLET ORAL 2 TIMES DAILY WITH MEALS
Qty: 180 TABLET | Refills: 1 | Status: SHIPPED | OUTPATIENT
Start: 2024-08-29 | End: 2025-08-29

## 2024-09-23 ENCOUNTER — OFFICE VISIT (OUTPATIENT)
Dept: PULMONOLOGY | Facility: CLINIC | Age: 64
End: 2024-09-23
Payer: COMMERCIAL

## 2024-09-23 VITALS — HEIGHT: 68 IN | WEIGHT: 237 LBS | BODY MASS INDEX: 35.92 KG/M2

## 2024-09-23 DIAGNOSIS — G47.33 OSA (OBSTRUCTIVE SLEEP APNEA): Primary | ICD-10-CM

## 2024-09-23 DIAGNOSIS — G47.33 OSA ON CPAP: ICD-10-CM

## 2024-09-23 DIAGNOSIS — E66.01 SEVERE OBESITY (BMI 35.0-39.9) WITH COMORBIDITY: ICD-10-CM

## 2024-09-23 PROCEDURE — 1159F MED LIST DOCD IN RCRD: CPT | Mod: CPTII,95,, | Performed by: NURSE PRACTITIONER

## 2024-09-23 PROCEDURE — 3008F BODY MASS INDEX DOCD: CPT | Mod: CPTII,95,, | Performed by: NURSE PRACTITIONER

## 2024-09-23 PROCEDURE — 99213 OFFICE O/P EST LOW 20 MIN: CPT | Mod: 95,,, | Performed by: NURSE PRACTITIONER

## 2024-09-23 PROCEDURE — 1160F RVW MEDS BY RX/DR IN RCRD: CPT | Mod: CPTII,95,, | Performed by: NURSE PRACTITIONER

## 2024-09-23 NOTE — PROGRESS NOTES
"Subjective:      Patient ID: Dyan Saini is a 64 y.o. female.    Chief Complaint: Sleep Apnea  The patient location is: [place]  The chief complaint leading to consultation is: sleep apnea  Visit type: Virtual visit with synchronous audio and video  Total time spent with patient: 13 miin   Each patient to whom he or she provides medical services by telemedicine is:  (1) informed of the relationship between the physician and patient and the respective role of any other health care provider with respect to management of the patient; and (2) notified that he or she may decline to receive medical services by telemedicine and may withdraw from such care at any time.       HPI  Presents for sleep apnea on AutoPAP therapy. Patient states improved symptoms with use of AutoPAP. Sleeping more soundly. Waking up feeling more refreshed. Improved daytime sleepiness. Patient states she is benefiting from use of the AutoPAP.   She has had some weight gain. 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   If she is able to sleep, she feels great.     Patient Active Problem List   Diagnosis    Endometrial hyperplasia without atypia, complex    Endometriosis    Postmenopausal bleeding    Palpitations with regular cardiac rhythm    GOMEZ on CPAP     Ht 5' 8" (1.727 m)   Wt 107.5 kg (236 lb 15.9 oz)   LMP 07/08/2013   BMI 36.03 kg/m²   Body mass index is 36.03 kg/m².    Review of Systems   Constitutional:  Positive for fatigue.   HENT: Negative.     Respiratory: Negative.     Cardiovascular: Negative.    Musculoskeletal: Negative.    Gastrointestinal: Negative.    Neurological: Negative.    Psychiatric/Behavioral:  Positive for sleep disturbance.      Objective:      Physical Exam  Constitutional:       Appearance: She is well-developed. She is obese.   HENT:      Head: Normocephalic and atraumatic.      Nose: Nose normal.      Mouth/Throat:      Pharynx: Oropharynx is clear. "   Cardiovascular:      Rate and Rhythm: Normal rate and regular rhythm.      Heart sounds: No murmur heard.     No gallop.   Pulmonary:      Effort: Pulmonary effort is normal.      Breath sounds: Normal breath sounds.   Abdominal:      Palpations: Abdomen is soft. There is no mass.      Tenderness: There is no abdominal tenderness.   Musculoskeletal:         General: Normal range of motion.      Cervical back: Normal range of motion and neck supple.   Skin:     General: Skin is warm and dry.   Neurological:      Mental Status: She is alert and oriented to person, place, and time.   Psychiatric:         Mood and Affect: Mood normal.         Behavior: Behavior normal.       Personal Diagnostic Review      9/23/2024     1:41 PM   EPWORTH SLEEPINESS SCALE   Sitting and reading 0   Watching TV 0   Sitting, inactive in a public place (e.g. a theatre or a meeting) 0   As a passenger in a car for an hour without a break 0   Lying down to rest in the afternoon when circumstances permit 2   Sitting and talking to someone 0   Sitting quietly after a lunch without alcohol 0   In a car, while stopped for a few minutes in traffic 0   Total score 2                Assessment:       1. GOMEZ (obstructive sleep apnea)    2. GOMEZ on CPAP    3. Severe obesity (BMI 35.0-39.9) with comorbidity        Outpatient Encounter Medications as of 9/23/2024   Medication Sig Dispense Refill    carvediloL (COREG) 12.5 MG tablet Take 1 tablet (12.5 mg total) by mouth 2 (two) times daily with meals. 180 tablet 1    [DISCONTINUED] carvediloL (COREG) 12.5 MG tablet Take 1 tablet (12.5 mg total) by mouth 2 (two) times daily with meals. 180 tablet 1     No facility-administered encounter medications on file as of 9/23/2024.     Orders Placed This Encounter   Procedures    CPAP/BIPAP SUPPLIES     Order Specific Question:   Length of need (1-99 months):     Answer:   99     Order Specific Question:   Choose ONE mask type and its corresponding cushions and/or  pillows:     Answer:    Nasal Mask, 1 per 90 days:  Nasal Cushions, (6 per 90 days):  Nasal Pillows, (6 per 90 days)     Order Specific Question:   Choose EITHER Heated or Non-Heated Tubjing     Answer:    Non-Heated Tubing, 1 per 90 days     Order Specific Question:   All other supplies as needed as listed below:     Answer:    Headgear, 1 per 180 days     Order Specific Question:   All other supplies as needed as listed below:     Answer:    Disposable Filter, 6 per 90 days     Order Specific Question:   All other supplies as needed as listed below:     Answer:    Non-Disposable Filter, 1 per 180 days     Order Specific Question:   All other supplies as needed as listed below:     Answer:    Humidifier Chamber, 1 per 180 days     Order Specific Question:   All other supplies as needed as listed below:     Answer:    Chin Strap, 1 per 180 days     Plan:     Try to sleep more for health   Wear PAP over 4 hours/night    1. GOMEZ (obstructive sleep apnea)  -     CPAP/BIPAP SUPPLIES    2. GOMEZ on CPAP  Overview:  11/29/2023, 11/30/2023 Home Sleep Study 2 night study  MODERATE GOMEZ with overall AHI 21.5/hr.   12/11/2023 order Auto CPAP 5-20 cm   01/24/2024 Setup Resmed HMS  On Auto CPAP 4-20 cm with optimal control AHI 0.7  Nasal wisp mask   HME: HMS       3. Severe obesity (BMI 35.0-39.9) with comorbidity     Weight loss and exercise to improve overall health.    I spent a total of 20 minutes on the day of the visit.  This includes face to face time and non-face to face time preparing to see the patient (eg, review of tests), obtaining and/or reviewing separately obtained history, documenting clinical information in the electronic or other health record, independently interpreting results and communicating results to the patient/family/caregiver, or care coordinator.                  Elizabeth LeJeune, ACNP, ANP

## 2024-09-27 ENCOUNTER — OFFICE VISIT (OUTPATIENT)
Dept: CARDIOLOGY | Facility: CLINIC | Age: 64
End: 2024-09-27
Payer: COMMERCIAL

## 2024-09-27 VITALS
DIASTOLIC BLOOD PRESSURE: 82 MMHG | SYSTOLIC BLOOD PRESSURE: 126 MMHG | OXYGEN SATURATION: 96 % | HEART RATE: 83 BPM | HEIGHT: 68 IN | WEIGHT: 236.13 LBS | BODY MASS INDEX: 35.79 KG/M2

## 2024-09-27 DIAGNOSIS — N85.01 ENDOMETRIAL HYPERPLASIA WITHOUT ATYPIA, COMPLEX: ICD-10-CM

## 2024-09-27 DIAGNOSIS — G47.33 OSA (OBSTRUCTIVE SLEEP APNEA): ICD-10-CM

## 2024-09-27 DIAGNOSIS — N80.9 ENDOMETRIOSIS: ICD-10-CM

## 2024-09-27 DIAGNOSIS — G47.33 OSA ON CPAP: ICD-10-CM

## 2024-09-27 DIAGNOSIS — R00.2 PALPITATIONS: Primary | ICD-10-CM

## 2024-09-27 DIAGNOSIS — N95.0 POSTMENOPAUSAL BLEEDING: ICD-10-CM

## 2024-09-27 DIAGNOSIS — I10 PRIMARY HYPERTENSION: ICD-10-CM

## 2024-09-27 DIAGNOSIS — R03.0 ELEVATED BLOOD PRESSURE READING: ICD-10-CM

## 2024-09-27 PROCEDURE — 99999 PR PBB SHADOW E&M-EST. PATIENT-LVL IV: CPT | Mod: PBBFAC,,, | Performed by: INTERNAL MEDICINE

## 2024-09-27 NOTE — PROGRESS NOTES
Subjective:   Patient ID:  Dyan Saini is a 64 y.o. female who presents for cardiac consult of Follow-up      Referral by: No referring provider defined for this encounter.     Reason for consult: palpitations       HPI  The patient came in today for cardiac consult of Follow-up      Dyan Saini is a 64 y.o. female with HTN, obesity, GOMEZ, post menopausal bleeding sec to endometriosis, endometrial hyperplasia s/p hysterectomy presents for follow up CV eval.       2/7/24  BP elevated 150/90. HR 90s.   BMI 35 - 231 lbs.   ECHO 11/2023 with normal bi V function, PASP 41 mmHg.   Holter neg 11/2023  She still has trouble sleeping at night, has palpitations at night.       5/21/24  From pt - I just had a virtual visit with Pulmonologist Nurse Practitioner.I wanted to let you know my blood pressure is 138/89 My Sleep apnea is being controlled by the machine very well.Are you ok with my Blood Pressure!   I increase Coreg to 12.5 mg BID  BP and HR stable today. BMI 36 - 236 lbs   She is able to tolerate CPAP for the most part.     9/27/24  Had recent pulm eval  - will use CPAP more.   BP and HR stable. BMI 35 - 236 lbs   She has not been sleeping much due to her son having seizures.       Procedure Note    2 night study  MODERATE OBSTRUCTIVE SLEEP APNEA with overall AHI 21.5/hr (128 events):  night #1  Oxygen desaturation: 86%. SpO2 between 85% to 89% for 2 min.  Patient snored 99% time above 50 .  Heart rate range: 72 bpm - 111bpm  REC's:  Therapy with APAP at 6-20 cm WP using mask of choice with heated humidification is an option.       Holter monitor - 48 hour    Result Date: 11/14/2023    The predominant rhythm is sinus.        Results for orders placed during the hospital encounter of 11/08/23    Echo    Interpretation Summary    Left Ventricle: The left ventricle is normal in size. Normal wall thickness. There is concentric hypertrophy. Normal wall motion. There is normal systolic function with a visually  estimated ejection fraction of 60 - 65%. There is normal diastolic function.    Right Ventricle: Normal right ventricular cavity size. Wall thickness is normal. Right ventricle wall motion  is normal. Systolic function is normal.    Tricuspid Valve: There is mild regurgitation.    Pulmonary Artery: The estimated pulmonary artery systolic pressure is 41 mmHg.    IVC/SVC: Normal venous pressure at 3 mmHg.        Past Medical History:   Diagnosis Date    Endometrial hyperplasia without atypia, complex 2016    Endometrial polyp 2019    Endometriosis        Past Surgical History:   Procedure Laterality Date     SECTION      COLONOSCOPY      normal    DILATION AND CURETTAGE OF UTERUS  2016    HYSTEROSCOPY WITH DILATION AND CURETTAGE OF UTERUS  2019    polyp    HYSTEROSCOPY WITH DILATION AND CURETTAGE OF UTERUS  07/10/2023    Hyperplasia without atypia within endometrial polyp    LAPAROSCOPY      endometrioma    ROBOTIC HYSTERECTOMY, WITH SALPINGO-OOPHORECTOMY  2023    Endometrial hyperplasia    TUBAL LIGATION Bilateral        Social History     Tobacco Use    Smoking status: Never    Smokeless tobacco: Never   Substance Use Topics    Alcohol use: No    Drug use: No       Family History   Problem Relation Name Age of Onset    Breast cancer Mother          60 and 70    Breast cancer Paternal Grandmother          50's       Patient's Medications   New Prescriptions    No medications on file   Previous Medications    CARVEDILOL (COREG) 12.5 MG TABLET    Take 1 tablet (12.5 mg total) by mouth 2 (two) times daily with meals.    MAGNESIUM ASPARTATE HCL 61 MG (615 MG) TBEC    Take by mouth once.   Modified Medications    No medications on file   Discontinued Medications    No medications on file       Review of Systems   Constitutional: Negative.    HENT: Negative.     Eyes: Negative.    Respiratory: Negative.     Cardiovascular:  Positive for palpitations.   Gastrointestinal: Negative.   "  Genitourinary: Negative.    Musculoskeletal: Negative.    Skin: Negative.    Neurological: Negative.    Endo/Heme/Allergies: Negative.    Psychiatric/Behavioral: Negative.     All 12 systems otherwise negative.      Wt Readings from Last 3 Encounters:   09/27/24 107.1 kg (236 lb 1.8 oz)   09/23/24 107.5 kg (236 lb 15.9 oz)   05/21/24 107.5 kg (236 lb 15.9 oz)     Temp Readings from Last 3 Encounters:   11/08/23 98.8 °F (37.1 °C)   07/10/13 97.8 °F (36.6 °C) (Oral)     BP Readings from Last 3 Encounters:   09/27/24 126/82   05/21/24 135/82   03/19/24 138/89     Pulse Readings from Last 3 Encounters:   09/27/24 83   05/21/24 77   03/19/24 92       /82 (BP Location: Left arm, Patient Position: Sitting, BP Method: Large (Manual))   Pulse 83   Ht 5' 8" (1.727 m)   Wt 107.1 kg (236 lb 1.8 oz)   LMP 07/08/2013   SpO2 96%   BMI 35.90 kg/m²     Objective:   Physical Exam  Vitals and nursing note reviewed.   Constitutional:       General: She is not in acute distress.     Appearance: She is well-developed. She is not diaphoretic.   HENT:      Head: Normocephalic and atraumatic.      Nose: Nose normal.   Eyes:      General: No scleral icterus.     Conjunctiva/sclera: Conjunctivae normal.   Neck:      Thyroid: No thyromegaly.      Vascular: No JVD.   Cardiovascular:      Rate and Rhythm: Normal rate and regular rhythm.      Heart sounds: S1 normal and S2 normal. No murmur heard.     No friction rub. No gallop. No S3 or S4 sounds.   Pulmonary:      Effort: Pulmonary effort is normal. No respiratory distress.      Breath sounds: Normal breath sounds. No stridor. No wheezing or rales.   Chest:      Chest wall: No tenderness.   Abdominal:      General: Bowel sounds are normal. There is no distension.      Palpations: Abdomen is soft. There is no mass.      Tenderness: There is no abdominal tenderness. There is no rebound.   Genitourinary:     Comments: Deferred  Musculoskeletal:         General: No tenderness or " deformity. Normal range of motion.      Cervical back: Normal range of motion and neck supple.   Lymphadenopathy:      Cervical: No cervical adenopathy.   Skin:     General: Skin is warm and dry.      Coloration: Skin is not pale.      Findings: No erythema or rash.   Neurological:      Mental Status: She is alert and oriented to person, place, and time.      Motor: No abnormal muscle tone.      Coordination: Coordination normal.   Psychiatric:         Behavior: Behavior normal.         Thought Content: Thought content normal.         Judgment: Judgment normal.         Lab Results   Component Value Date     11/08/2023     07/10/2013    K 3.6 11/08/2023    K 3.9 07/10/2013     07/10/2013    CO2 27 11/08/2023    CO2 22 (L) 07/10/2013    BUN 20.0 11/08/2023    BUN 13 07/10/2013    CREATININE 0.7 11/08/2023    CREATININE 0.7 07/10/2013     (H) 11/08/2023     07/10/2013    MG 2.0 07/10/2013    AST 27 11/08/2023    AST 19 07/10/2013    ALT 23 11/08/2023    ALT 22 07/10/2013    ALBUMIN 4.3 11/08/2023    ALBUMIN 4.2 07/10/2013    PROT 7.7 07/10/2013    BILITOT 0.6 07/10/2013    WBC 14.43 (H) 07/10/2013    HGB 13.3 07/10/2013    HCT 41.3 07/10/2013    MCV 86.2 07/10/2013     07/10/2013    INR 1.0 07/10/2013         INR (no units)   Date Value   07/10/2013 1.0          Assessment:      1. Palpitations    2. Primary hypertension    3. GOMEZ (obstructive sleep apnea)    4. Elevated blood pressure reading    5. Endometrial hyperplasia without atypia, complex    6. Endometriosis    7. BMI 35.0-35.9,adult    8. Postmenopausal bleeding    9. GOMEZ on CPAP          Plan:     HTN with Palpitations - improved  - ECHO 11/2023 with normal bi V function, PASP 41 mmHg.   - Holter neg 11/2023  - cont Coreg 6.25 mg BID -- increased to 12.5 mg     2. Endometrial hyperplasia, endometriosis s/p hysterectomy   - with post menopausal bleeding  - f/u OB/gyn    3. Obesity. BMI 34  --> BMI 35 -  231 lbs  --> BMI  36 - 236 lbs  BMI 35 - 236 lbs   - cont weight loss   - discussed weight loss surgery as well, vs Wegovy if approved     4. GOMEZ  - cont CPAP  - discussed Inspire     Visit today included increased complexity associated with the care of the episodic problem palpitations addressed and managing the longitudinal care of the patient due to the serious and/or complex managed problem(s) .      Thank you for allowing me to participate in this patient's care. Please do not hesitate to contact me with any questions or concerns. Consult note has been forwarded to the referral physician.     Pedrito Escalona MD, [facility]  Cardiovascular Disease  [facility], [place]  [phone] (P)

## 2024-11-19 ENCOUNTER — PATIENT MESSAGE (OUTPATIENT)
Dept: NEUROLOGY | Facility: CLINIC | Age: 64
End: 2024-11-19
Payer: COMMERCIAL

## 2024-12-30 RX ORDER — CARVEDILOL 12.5 MG/1
12.5 TABLET ORAL 2 TIMES DAILY WITH MEALS
Qty: 180 TABLET | Refills: 1 | Status: SHIPPED | OUTPATIENT
Start: 2024-12-30 | End: 2025-12-30

## 2025-03-10 RX ORDER — CARVEDILOL 12.5 MG/1
12.5 TABLET ORAL 2 TIMES DAILY WITH MEALS
Qty: 180 TABLET | Refills: 1 | Status: SHIPPED | OUTPATIENT
Start: 2025-03-10 | End: 2026-03-10

## 2025-03-18 DIAGNOSIS — Z00.00 ROUTINE HEALTH MAINTENANCE: ICD-10-CM

## 2025-03-18 DIAGNOSIS — R00.2 PALPITATIONS WITH REGULAR CARDIAC RHYTHM: Primary | ICD-10-CM

## 2025-03-19 ENCOUNTER — HOSPITAL ENCOUNTER (OUTPATIENT)
Dept: CARDIOLOGY | Facility: HOSPITAL | Age: 65
Discharge: HOME | End: 2025-03-19
Attending: INTERNAL MEDICINE
Payer: COMMERCIAL

## 2025-03-19 ENCOUNTER — OFFICE VISIT (OUTPATIENT)
Dept: CARDIOLOGY | Facility: CLINIC | Age: 65
End: 2025-03-19
Payer: COMMERCIAL

## 2025-03-19 VITALS
SYSTOLIC BLOOD PRESSURE: 128 MMHG | OXYGEN SATURATION: 98 % | HEIGHT: 68 IN | HEART RATE: 73 BPM | WEIGHT: 240.5 LBS | DIASTOLIC BLOOD PRESSURE: 88 MMHG | BODY MASS INDEX: 36.45 KG/M2

## 2025-03-19 DIAGNOSIS — G47.33 MODERATE OBSTRUCTIVE SLEEP APNEA: ICD-10-CM

## 2025-03-19 DIAGNOSIS — G47.33 OSA ON CPAP: ICD-10-CM

## 2025-03-19 DIAGNOSIS — G47.33 OSA (OBSTRUCTIVE SLEEP APNEA): ICD-10-CM

## 2025-03-19 DIAGNOSIS — R00.2 PALPITATIONS WITH REGULAR CARDIAC RHYTHM: ICD-10-CM

## 2025-03-19 DIAGNOSIS — E66.01 SEVERE OBESITY (BMI 35.0-39.9) WITH COMORBIDITY: ICD-10-CM

## 2025-03-19 DIAGNOSIS — I10 PRIMARY HYPERTENSION: ICD-10-CM

## 2025-03-19 DIAGNOSIS — R00.2 PALPITATIONS: ICD-10-CM

## 2025-03-19 DIAGNOSIS — R00.2 PALPITATIONS WITH REGULAR CARDIAC RHYTHM: Primary | ICD-10-CM

## 2025-03-19 DIAGNOSIS — G47.30 SLEEP APNEA, UNSPECIFIED TYPE: ICD-10-CM

## 2025-03-19 DIAGNOSIS — E78.5 HYPERLIPIDEMIA, UNSPECIFIED HYPERLIPIDEMIA TYPE: ICD-10-CM

## 2025-03-19 DIAGNOSIS — Z00.00 ROUTINE HEALTH MAINTENANCE: ICD-10-CM

## 2025-03-19 LAB
OHS QRS DURATION: 76 MS
OHS QTC CALCULATION: 427 MS

## 2025-03-19 PROCEDURE — 1160F RVW MEDS BY RX/DR IN RCRD: CPT | Mod: CPTII,S$GLB,, | Performed by: INTERNAL MEDICINE

## 2025-03-19 PROCEDURE — G2211 COMPLEX E/M VISIT ADD ON: HCPCS | Mod: S$GLB,,, | Performed by: INTERNAL MEDICINE

## 2025-03-19 PROCEDURE — 93010 ELECTROCARDIOGRAM REPORT: CPT | Mod: ,,, | Performed by: INTERNAL MEDICINE

## 2025-03-19 PROCEDURE — 1159F MED LIST DOCD IN RCRD: CPT | Mod: CPTII,S$GLB,, | Performed by: INTERNAL MEDICINE

## 2025-03-19 PROCEDURE — 99999 PR PBB SHADOW E&M-EST. PATIENT-LVL III: CPT | Mod: PBBFAC,,, | Performed by: INTERNAL MEDICINE

## 2025-03-19 PROCEDURE — 99214 OFFICE O/P EST MOD 30 MIN: CPT | Mod: S$GLB,,, | Performed by: INTERNAL MEDICINE

## 2025-03-19 PROCEDURE — 3008F BODY MASS INDEX DOCD: CPT | Mod: CPTII,S$GLB,, | Performed by: INTERNAL MEDICINE

## 2025-03-19 PROCEDURE — 3074F SYST BP LT 130 MM HG: CPT | Mod: CPTII,S$GLB,, | Performed by: INTERNAL MEDICINE

## 2025-03-19 PROCEDURE — 93005 ELECTROCARDIOGRAM TRACING: CPT

## 2025-03-19 PROCEDURE — 3079F DIAST BP 80-89 MM HG: CPT | Mod: CPTII,S$GLB,, | Performed by: INTERNAL MEDICINE

## 2025-03-19 RX ORDER — TIRZEPATIDE 2.5 MG/.5ML
2.5 INJECTION, SOLUTION SUBCUTANEOUS
Qty: 4 PEN | Refills: 1 | Status: SHIPPED | OUTPATIENT
Start: 2025-03-19 | End: 2025-03-20

## 2025-03-19 RX ORDER — CARVEDILOL 12.5 MG/1
12.5 TABLET ORAL 2 TIMES DAILY WITH MEALS
Qty: 180 TABLET | Refills: 1 | Status: SHIPPED | OUTPATIENT
Start: 2025-03-19 | End: 2026-03-19

## 2025-03-19 NOTE — PROGRESS NOTES
Subjective:   Patient ID:  Dyan Saini is a 64 y.o. female who presents for cardiac consult of No chief complaint on file.      Referral by: Alicia Dale,   [address]  [place] [address]     Reason for consult: palpitations       HPI  The patient came in today for cardiac consult of No chief complaint on file.      Dyan Saini is a 64 y.o. female with HTN, HLD, obesity, GOMEZ, post menopausal bleeding sec to endometriosis, endometrial hyperplasia s/p hysterectomy presents for follow up CV eval.       9/27/24  Had recent pulm eval  - will use CPAP more.   BP and HR stable. BMI 35 - 236 lbs   She has not been sleeping much due to her son having seizures.     3/19/25  BP and HR stable. BMI 36 - 240 lbs     Benefits of Glp1 agonist like medication prescribed reviewed with patient including improvement of insulin resistance which is the underlying hormonal dysfunction causing and leading to diabetes. This improves hormonal balance and can lower blood sugars if patient sugars are elevated. The patient was explained how the medicine works. I also reviewed side effects of medication including loss of appetite , heartburn/GERD, constipation, diarrhea, nausea (usually low blood sugar if before or between meals ), vomiting bloating , headache and risk of cancer if patient has personal or family history of MEN syndrome.     Will start Zepbound.     Procedure Note    2 night study  MODERATE OBSTRUCTIVE SLEEP APNEA with overall AHI 21.5/hr (128 events):  night #1  Oxygen desaturation: 86%. SpO2 between 85% to 89% for 2 min.  Patient snored 99% time above 50 .  Heart rate range: 72 bpm - 111bpm  REC's:  Therapy with APAP at 6-20 cm WP using mask of choice with heated humidification is an option.       No cardiac monitor results found for the past 12 months   Results for orders placed during the hospital encounter of 11/08/23    Echo    Interpretation Summary    Left Ventricle: The left ventricle is normal in  size. Normal wall thickness. There is concentric hypertrophy. Normal wall motion. There is normal systolic function with a visually estimated ejection fraction of 60 - 65%. There is normal diastolic function.    Right Ventricle: Normal right ventricular cavity size. Wall thickness is normal. Right ventricle wall motion  is normal. Systolic function is normal.    Tricuspid Valve: There is mild regurgitation.    Pulmonary Artery: The estimated pulmonary artery systolic pressure is 41 mmHg.    IVC/SVC: Normal venous pressure at 3 mmHg.        Past Medical History:   Diagnosis Date    Endometrial hyperplasia without atypia, complex 2016    Endometrial polyp 2019    Endometriosis        Past Surgical History:   Procedure Laterality Date     SECTION      COLONOSCOPY      normal    DILATION AND CURETTAGE OF UTERUS  2016    HYSTEROSCOPY WITH DILATION AND CURETTAGE OF UTERUS  2019    polyp    HYSTEROSCOPY WITH DILATION AND CURETTAGE OF UTERUS  07/10/2023    Hyperplasia without atypia within endometrial polyp    LAPAROSCOPY      endometrioma    ROBOTIC HYSTERECTOMY, WITH SALPINGO-OOPHORECTOMY  2023    Endometrial hyperplasia    TUBAL LIGATION Bilateral        Social History     Tobacco Use    Smoking status: Never    Smokeless tobacco: Never   Substance Use Topics    Alcohol use: No    Drug use: No       Family History   Problem Relation Name Age of Onset    Breast cancer Mother          60 and 70    Breast cancer Paternal Grandmother          50's       Patient's Medications   New Prescriptions    TIRZEPATIDE, WEIGHT LOSS, (ZEPBOUND) 2.5 MG/0.5 ML PNIJ    Inject 2.5 mg into the skin every 7 days.   Previous Medications    MAGNESIUM ASPARTATE HCL 61 MG (615 MG) TBEC    Take by mouth once.   Modified Medications    Modified Medication Previous Medication    CARVEDILOL (COREG) 12.5 MG TABLET carvediloL (COREG) 12.5 MG tablet       Take 1 tablet (12.5 mg total) by mouth 2 (two) times  "daily with meals.    Take 1 tablet (12.5 mg total) by mouth 2 (two) times daily with meals.   Discontinued Medications    No medications on file       Review of Systems   Constitutional: Negative.    HENT: Negative.     Eyes: Negative.    Respiratory: Negative.     Cardiovascular:  Positive for palpitations.   Gastrointestinal: Negative.    Genitourinary: Negative.    Musculoskeletal: Negative.    Skin: Negative.    Neurological: Negative.    Endo/Heme/Allergies: Negative.    Psychiatric/Behavioral: Negative.     All 12 systems otherwise negative.      Wt Readings from Last 3 Encounters:   03/19/25 109.1 kg (240 lb 8.4 oz)   09/27/24 107.1 kg (236 lb 1.8 oz)   09/23/24 107.5 kg (236 lb 15.9 oz)     Temp Readings from Last 3 Encounters:   11/08/23 98.8 °F (37.1 °C)   07/10/13 97.8 °F (36.6 °C) (Oral)     BP Readings from Last 3 Encounters:   03/19/25 128/88   09/27/24 126/82   05/21/24 135/82     Pulse Readings from Last 3 Encounters:   03/19/25 73   09/27/24 83   05/21/24 77       /88 (BP Location: Left arm, Patient Position: Sitting)   Pulse 73   Ht 5' 8" (1.727 m)   Wt 109.1 kg (240 lb 8.4 oz)   LMP 07/08/2013   SpO2 98%   BMI 36.57 kg/m²     Objective:   Physical Exam  Vitals and nursing note reviewed.   Constitutional:       General: She is not in acute distress.     Appearance: She is well-developed. She is not diaphoretic.   HENT:      Head: Normocephalic and atraumatic.      Nose: Nose normal.   Eyes:      General: No scleral icterus.     Conjunctiva/sclera: Conjunctivae normal.   Neck:      Thyroid: No thyromegaly.      Vascular: No JVD.   Cardiovascular:      Rate and Rhythm: Normal rate and regular rhythm.      Heart sounds: S1 normal and S2 normal. No murmur heard.     No friction rub. No gallop. No S3 or S4 sounds.   Pulmonary:      Effort: Pulmonary effort is normal. No respiratory distress.      Breath sounds: Normal breath sounds. No stridor. No wheezing or rales.   Chest:      Chest wall: " No tenderness.   Abdominal:      General: Bowel sounds are normal. There is no distension.      Palpations: Abdomen is soft. There is no mass.      Tenderness: There is no abdominal tenderness. There is no rebound.   Genitourinary:     Comments: Deferred  Musculoskeletal:         General: No tenderness or deformity. Normal range of motion.      Cervical back: Normal range of motion and neck supple.   Lymphadenopathy:      Cervical: No cervical adenopathy.   Skin:     General: Skin is warm and dry.      Coloration: Skin is not pale.      Findings: No erythema or rash.   Neurological:      Mental Status: She is alert and oriented to person, place, and time.      Motor: No abnormal muscle tone.      Coordination: Coordination normal.   Psychiatric:         Behavior: Behavior normal.         Thought Content: Thought content normal.         Judgment: Judgment normal.         Lab Results   Component Value Date     11/08/2023     07/10/2013    K 3.6 11/08/2023    K 3.9 07/10/2013     07/10/2013    CO2 27 11/08/2023    CO2 22 (L) 07/10/2013    BUN 20.0 11/08/2023    BUN 13 07/10/2013    CREATININE 0.7 11/08/2023    CREATININE 0.7 07/10/2013     (H) 11/08/2023     07/10/2013    HGBA1C 5.4 10/09/2024    MG 2.0 07/10/2013    AST 27 11/08/2023    AST 19 07/10/2013    ALT 23 11/08/2023    ALT 22 07/10/2013    ALBUMIN 4.3 11/08/2023    ALBUMIN 4.2 07/10/2013    PROT 7.7 07/10/2013    BILITOT 0.6 07/10/2013    WBC 14.43 (H) 07/10/2013    HGB 13.3 07/10/2013    HCT 41.3 07/10/2013    MCV 86.2 07/10/2013     07/10/2013    INR 1.0 07/10/2013    TSH 1.812 10/09/2024    CHOL 214 (H) 10/09/2024    HDL 54 (L) 10/09/2024    LDLCALC 138 (H) 10/09/2024    TRIG 110 10/09/2024         INR (no units)   Date Value   07/10/2013 1.0          Assessment:      1. Palpitations with regular cardiac rhythm    2. Palpitations    3. GOMEZ (obstructive sleep apnea)    4. Primary hypertension    5. BMI 35.0-35.9,adult     6. GOMEZ on CPAP    7. Sleep apnea, unspecified type    8. Moderate obstructive sleep apnea    9. Severe obesity (BMI 35.0-39.9) with comorbidity    10. Hyperlipidemia, unspecified hyperlipidemia type          Plan:     HTN with Palpitations -  - ECHO 11/2023 with normal bi V function, PASP 41 mmHg.   - Holter neg 11/2023  - cont Coreg 6.25 mg BID -- increased to 12.5 mg     2. Endometrial hyperplasia, endometriosis s/p hysterectomy   - with post menopausal bleeding  - f/u OB/gyn    3. Severe Obesity. BMI 34  --> BMI 35 -  231 lbs  --> BMI 36 - 236 lbs  BMI 35 - 236 lbs  --> BMI 36 - 240 lbs   - cont weight loss   - discussed weight loss surgery as well, vs Wegovy if approved   - start Zepbound for moderate GOMEZ and severe obesity   - no plans for weight loss surgery now     4. GOMEZ  - cont CPAP  - discussed Inspire     5. HLD  - repeat labs as needed  - cont low ansley diet        Visit today included increased complexity associated with the care of the episodic problem palpitations addressed and managing the longitudinal care of the patient due to the serious and/or complex managed problem(s) .      Thank you for allowing me to participate in this patient's care. Please do not hesitate to contact me with any questions or concerns. Consult note has been forwarded to the referral physician.     Pedrito Escalona MD, [facility]  Cardiovascular Disease  [facility], [place]  [phone] (P)

## 2025-03-20 ENCOUNTER — PATIENT MESSAGE (OUTPATIENT)
Dept: CARDIOLOGY | Facility: CLINIC | Age: 65
End: 2025-03-20
Payer: COMMERCIAL

## 2025-03-20 RX ORDER — TIRZEPATIDE 2.5 MG/.5ML
INJECTION, SOLUTION SUBCUTANEOUS
Qty: 4 PEN | Refills: 1 | Status: SHIPPED | OUTPATIENT
Start: 2025-03-20

## 2025-04-16 ENCOUNTER — PATIENT MESSAGE (OUTPATIENT)
Dept: PULMONOLOGY | Facility: CLINIC | Age: 65
End: 2025-04-16
Payer: COMMERCIAL

## 2025-05-01 ENCOUNTER — PATIENT MESSAGE (OUTPATIENT)
Dept: CARDIOLOGY | Facility: CLINIC | Age: 65
End: 2025-05-01
Payer: COMMERCIAL

## 2025-05-02 ENCOUNTER — PATIENT MESSAGE (OUTPATIENT)
Dept: PULMONOLOGY | Facility: CLINIC | Age: 65
End: 2025-05-02
Payer: COMMERCIAL